# Patient Record
Sex: MALE | Race: WHITE | NOT HISPANIC OR LATINO | Employment: FULL TIME | URBAN - METROPOLITAN AREA
[De-identification: names, ages, dates, MRNs, and addresses within clinical notes are randomized per-mention and may not be internally consistent; named-entity substitution may affect disease eponyms.]

---

## 2023-10-01 PROBLEM — J44.9 COPD (CHRONIC OBSTRUCTIVE PULMONARY DISEASE) (MULTI): Status: ACTIVE | Noted: 2023-10-01

## 2023-10-01 PROBLEM — R93.1 ABNORMAL FINDINGS ON CARDIAC CATHETERIZATION: Status: ACTIVE | Noted: 2023-10-01

## 2023-10-01 PROBLEM — I50.30 HEART FAILURE WITH PRESERVED LEFT VENTRICULAR FUNCTION (MULTI): Status: ACTIVE | Noted: 2023-10-01

## 2023-10-01 PROBLEM — I50.9 CHF (CONGESTIVE HEART FAILURE) (MULTI): Status: ACTIVE | Noted: 2023-10-01

## 2023-10-01 PROBLEM — R06.09 EXERTIONAL DYSPNEA: Status: ACTIVE | Noted: 2023-10-01

## 2023-10-01 PROBLEM — I25.10 ATHEROSCLEROSIS OF CORONARY ARTERY: Status: ACTIVE | Noted: 2023-10-01

## 2023-10-01 PROBLEM — Z95.5 HISTORY OF HEART ARTERY STENT: Status: ACTIVE | Noted: 2023-10-01

## 2023-10-01 RX ORDER — ATORVASTATIN CALCIUM 80 MG/1
80 TABLET, FILM COATED ORAL EVERY MORNING
COMMUNITY

## 2023-10-01 RX ORDER — ASPIRIN 81 MG/1
81 TABLET ORAL DAILY
COMMUNITY

## 2023-10-01 RX ORDER — FLUOXETINE HYDROCHLORIDE 40 MG/1
2 CAPSULE ORAL DAILY
COMMUNITY
End: 2023-10-30 | Stop reason: ENTERED-IN-ERROR

## 2023-10-01 RX ORDER — BUPROPION HYDROCHLORIDE 100 MG/1
100 TABLET ORAL 2 TIMES DAILY
COMMUNITY

## 2023-10-01 RX ORDER — METOPROLOL SUCCINATE 50 MG/1
50 TABLET, EXTENDED RELEASE ORAL DAILY
COMMUNITY
End: 2023-10-03 | Stop reason: SINTOL

## 2023-10-01 RX ORDER — FUROSEMIDE 40 MG/1
40 TABLET ORAL DAILY
COMMUNITY
End: 2023-10-06 | Stop reason: SDUPTHER

## 2023-10-01 RX ORDER — CLOPIDOGREL BISULFATE 75 MG/1
75 TABLET ORAL EVERY MORNING
COMMUNITY

## 2023-10-01 RX ORDER — POTASSIUM CHLORIDE 750 MG/1
10 CAPSULE, EXTENDED RELEASE ORAL EVERY MORNING
COMMUNITY

## 2023-10-01 RX ORDER — NORTRIPTYLINE HYDROCHLORIDE 75 MG/1
2 CAPSULE ORAL NIGHTLY
COMMUNITY

## 2023-10-01 RX ORDER — IBUPROFEN 200 MG
TABLET ORAL
COMMUNITY
End: 2023-10-30 | Stop reason: ENTERED-IN-ERROR

## 2023-10-01 RX ORDER — LISINOPRIL AND HYDROCHLOROTHIAZIDE 20; 25 MG/1; MG/1
1 TABLET ORAL DAILY
COMMUNITY
End: 2023-10-30 | Stop reason: ENTERED-IN-ERROR

## 2023-10-03 ENCOUNTER — TELEPHONE (OUTPATIENT)
Dept: CARDIOLOGY | Facility: HOSPITAL | Age: 53
End: 2023-10-03

## 2023-10-03 ENCOUNTER — OFFICE VISIT (OUTPATIENT)
Dept: CARDIOLOGY | Facility: HOSPITAL | Age: 53
End: 2023-10-03
Payer: COMMERCIAL

## 2023-10-03 VITALS
WEIGHT: 315 LBS | HEART RATE: 91 BPM | HEIGHT: 74 IN | OXYGEN SATURATION: 96 % | BODY MASS INDEX: 40.43 KG/M2 | DIASTOLIC BLOOD PRESSURE: 77 MMHG | SYSTOLIC BLOOD PRESSURE: 139 MMHG

## 2023-10-03 DIAGNOSIS — I10 ESSENTIAL HYPERTENSION: Primary | ICD-10-CM

## 2023-10-03 DIAGNOSIS — J44.9 CHRONIC OBSTRUCTIVE PULMONARY DISEASE, UNSPECIFIED COPD TYPE (MULTI): ICD-10-CM

## 2023-10-03 DIAGNOSIS — I25.10 CORONARY ARTERY DISEASE INVOLVING NATIVE CORONARY ARTERY OF NATIVE HEART WITHOUT ANGINA PECTORIS: ICD-10-CM

## 2023-10-03 DIAGNOSIS — I50.32 CHRONIC HEART FAILURE WITH PRESERVED EJECTION FRACTION (MULTI): ICD-10-CM

## 2023-10-03 DIAGNOSIS — E78.2 MIXED HYPERLIPIDEMIA: ICD-10-CM

## 2023-10-03 PROCEDURE — 99215 OFFICE O/P EST HI 40 MIN: CPT | Performed by: STUDENT IN AN ORGANIZED HEALTH CARE EDUCATION/TRAINING PROGRAM

## 2023-10-03 PROCEDURE — 3075F SYST BP GE 130 - 139MM HG: CPT | Performed by: STUDENT IN AN ORGANIZED HEALTH CARE EDUCATION/TRAINING PROGRAM

## 2023-10-03 PROCEDURE — 3078F DIAST BP <80 MM HG: CPT | Performed by: STUDENT IN AN ORGANIZED HEALTH CARE EDUCATION/TRAINING PROGRAM

## 2023-10-03 PROCEDURE — 93005 ELECTROCARDIOGRAM TRACING: CPT | Performed by: STUDENT IN AN ORGANIZED HEALTH CARE EDUCATION/TRAINING PROGRAM

## 2023-10-03 PROCEDURE — 3008F BODY MASS INDEX DOCD: CPT | Performed by: STUDENT IN AN ORGANIZED HEALTH CARE EDUCATION/TRAINING PROGRAM

## 2023-10-03 RX ORDER — DILTIAZEM HYDROCHLORIDE 180 MG/1
180 CAPSULE, COATED, EXTENDED RELEASE ORAL DAILY
Qty: 90 CAPSULE | Refills: 1 | Status: SHIPPED | OUTPATIENT
Start: 2023-10-03 | End: 2024-04-08 | Stop reason: SDUPTHER

## 2023-10-03 RX ORDER — SERTRALINE HYDROCHLORIDE 100 MG/1
100 TABLET, FILM COATED ORAL 2 TIMES DAILY
COMMUNITY

## 2023-10-03 NOTE — PROGRESS NOTES
Subjective   Corey Jj is a 53 y.o. male. with a medical history of HfpEF, hypertension, and depression admitted with HFpEF and COPD exacerbation treated with TYRA Lasix. [facility] with MARIANA x1 to ostial RCA 4/2023 here today for follow up    He feels better since last visit  He takes 40 mg every day he gained some weight  He drinks lot of fluids  Has not been to cardiac rehab   He has troubles with erection and thinks related  to metoprolol  He quit smoking since last admission     Chief Complaint:  No chief complaint on file.    HPI    Review of Systems   All other systems reviewed and are negative.  12 points ROS negative expect for the above     Objective   Constitutional:       Appearance: Healthy appearance. Not in distress.   Neck:      Vascular: No JVR. JVD normal.   Pulmonary:      Effort: Pulmonary effort is normal.      Breath sounds: Normal breath sounds. No wheezing. No rhonchi. No rales.   Chest:      Chest wall: Not tender to palpatation.   Cardiovascular:      PMI at left midclavicular line. Normal rate. Regular rhythm. Normal S1. Normal S2.       Murmurs: There is no murmur.      No gallop.  No click. No rub.   Pulses:     Intact distal pulses.   Edema:     Peripheral edema absent.   Abdominal:      General: Bowel sounds are normal.      Palpations: Abdomen is soft.      Tenderness: There is no abdominal tenderness.   Musculoskeletal: Normal range of motion.         General: No tenderness. Skin:     General: Skin is warm and dry.   Neurological:      General: No focal deficit present.      Mental Status: Alert and oriented to person, place and time.         Lab Review:   No visits with results within 6 Month(s) from this visit.   Latest known visit with results is:   No results found for any previous visit.       Assessment/Plan   There were no encounter diagnoses.  Corey Jj is a 53 y.o. male. with a medical history of HfpEF, hypertension, and depression admitted with HFpEF and COPD exacerbation  treated with TYRA Lasix. [facility] with MARIANA x1 to ostial RCA 4/2023 here today for follow up    TTE with normal EF, asymmetrical septal hypertrophy     Overall he is doing well from cardiac perspective, however he gained weight.       plan  continue ASA  continue Plavix  continue atorvastatin  Switch toprol to diltiazem 180 mg every day given Erectile dysfunction   Continue Lasix 40 mg qd, increase lasix to 40 bid for 7 days, and take additional if needed in the future, weight based. Limit po fluid intake   Continue lisinopril/HCTZ  PCP follow up   Pulm referral for COPD   Cardiac rehab

## 2023-10-03 NOTE — PATIENT INSTRUCTIONS
Switch metoprolol to diltiazem 180 mg every day  Increase lasix to 40 mg twice a day for a week  Limit amount of fluid intake to 2L maximal per day  Will refer to cardiac rehab  Will refer to pulmonology   Ill see you back after 6 months

## 2023-10-05 LAB
ATRIAL RATE: 90 BPM
P AXIS: 60 DEGREES
P OFFSET: 199 MS
P ONSET: 125 MS
PR INTERVAL: 182 MS
Q ONSET: 216 MS
QRS COUNT: 15 BEATS
QRS DURATION: 96 MS
QT INTERVAL: 380 MS
QTC CALCULATION(BAZETT): 464 MS
QTC FREDERICIA: 435 MS
R AXIS: 32 DEGREES
T AXIS: 88 DEGREES
T OFFSET: 406 MS
VENTRICULAR RATE: 90 BPM

## 2023-10-05 PROCEDURE — 93010 ELECTROCARDIOGRAM REPORT: CPT | Performed by: STUDENT IN AN ORGANIZED HEALTH CARE EDUCATION/TRAINING PROGRAM

## 2023-10-06 DIAGNOSIS — I50.9 CONGESTIVE HEART FAILURE, UNSPECIFIED HF CHRONICITY, UNSPECIFIED HEART FAILURE TYPE (MULTI): ICD-10-CM

## 2023-10-06 RX ORDER — FUROSEMIDE 40 MG/1
40 TABLET ORAL DAILY
Qty: 30 TABLET | Refills: 6 | Status: SHIPPED | OUTPATIENT
Start: 2023-10-06 | End: 2023-10-17 | Stop reason: SDUPTHER

## 2023-10-17 DIAGNOSIS — I50.9 CONGESTIVE HEART FAILURE, UNSPECIFIED HF CHRONICITY, UNSPECIFIED HEART FAILURE TYPE (MULTI): ICD-10-CM

## 2023-10-17 RX ORDER — FUROSEMIDE 40 MG/1
40 TABLET ORAL DAILY
Qty: 30 TABLET | Refills: 6 | Status: SHIPPED | OUTPATIENT
Start: 2023-10-17 | End: 2023-11-01 | Stop reason: HOSPADM

## 2023-10-30 ENCOUNTER — APPOINTMENT (OUTPATIENT)
Dept: RADIOLOGY | Facility: HOSPITAL | Age: 53
End: 2023-10-30
Payer: COMMERCIAL

## 2023-10-30 ENCOUNTER — HOSPITAL ENCOUNTER (INPATIENT)
Facility: HOSPITAL | Age: 53
LOS: 2 days | Discharge: HOME | End: 2023-11-01
Attending: EMERGENCY MEDICINE | Admitting: STUDENT IN AN ORGANIZED HEALTH CARE EDUCATION/TRAINING PROGRAM
Payer: COMMERCIAL

## 2023-10-30 DIAGNOSIS — R09.02 HYPOXIC: ICD-10-CM

## 2023-10-30 DIAGNOSIS — I50.30 HEART FAILURE WITH PRESERVED LEFT VENTRICULAR FUNCTION (MULTI): ICD-10-CM

## 2023-10-30 DIAGNOSIS — I50.9 ACUTE ON CHRONIC CONGESTIVE HEART FAILURE, UNSPECIFIED HEART FAILURE TYPE (MULTI): Primary | ICD-10-CM

## 2023-10-30 LAB
ALBUMIN SERPL BCP-MCNC: 4 G/DL (ref 3.4–5)
ALP SERPL-CCNC: 104 U/L (ref 33–120)
ALT SERPL W P-5'-P-CCNC: 31 U/L (ref 10–52)
ANION GAP BLDV CALCULATED.4IONS-SCNC: 8 MMOL/L (ref 10–25)
ANION GAP SERPL CALC-SCNC: 15 MMOL/L (ref 10–20)
APTT PPP: 30 SECONDS (ref 27–38)
AST SERPL W P-5'-P-CCNC: 20 U/L (ref 9–39)
BASE EXCESS BLDV CALC-SCNC: 3.1 MMOL/L (ref -2–3)
BASOPHILS # BLD AUTO: 0.06 X10*3/UL (ref 0–0.1)
BASOPHILS NFR BLD AUTO: 0.6 %
BILIRUB SERPL-MCNC: 0.6 MG/DL (ref 0–1.2)
BNP SERPL-MCNC: 159 PG/ML (ref 0–99)
BODY TEMPERATURE: 37 DEGREES CELSIUS
BUN SERPL-MCNC: 19 MG/DL (ref 6–23)
CA-I BLDV-SCNC: 1.17 MMOL/L (ref 1.1–1.33)
CALCIUM SERPL-MCNC: 8.8 MG/DL (ref 8.6–10.3)
CARDIAC TROPONIN I PNL SERPL HS: 110 NG/L (ref 0–20)
CARDIAC TROPONIN I PNL SERPL HS: 197 NG/L (ref 0–20)
CHLORIDE BLDV-SCNC: 106 MMOL/L (ref 98–107)
CHLORIDE SERPL-SCNC: 104 MMOL/L (ref 98–107)
CO2 SERPL-SCNC: 27 MMOL/L (ref 21–32)
CREAT SERPL-MCNC: 1.37 MG/DL (ref 0.5–1.3)
EOSINOPHIL # BLD AUTO: 0.08 X10*3/UL (ref 0–0.7)
EOSINOPHIL NFR BLD AUTO: 0.8 %
ERYTHROCYTE [DISTWIDTH] IN BLOOD BY AUTOMATED COUNT: 14 % (ref 11.5–14.5)
FLUAV RNA RESP QL NAA+PROBE: NOT DETECTED
FLUBV RNA RESP QL NAA+PROBE: NOT DETECTED
GFR SERPL CREATININE-BSD FRML MDRD: 62 ML/MIN/1.73M*2
GLUCOSE BLD MANUAL STRIP-MCNC: 148 MG/DL (ref 74–99)
GLUCOSE BLDV-MCNC: 167 MG/DL (ref 74–99)
GLUCOSE SERPL-MCNC: 156 MG/DL (ref 74–99)
HCO3 BLDV-SCNC: 29 MMOL/L (ref 22–26)
HCT VFR BLD AUTO: 35.9 % (ref 41–52)
HCT VFR BLD EST: 35 % (ref 41–52)
HGB BLD-MCNC: 11 G/DL (ref 13.5–17.5)
HGB BLDV-MCNC: 11.5 G/DL (ref 13.5–17.5)
IMM GRANULOCYTES # BLD AUTO: 0.08 X10*3/UL (ref 0–0.7)
IMM GRANULOCYTES NFR BLD AUTO: 0.8 % (ref 0–0.9)
INHALED O2 CONCENTRATION: 0 %
INR PPP: 1.1 (ref 0.9–1.1)
LACTATE BLDV-SCNC: 1.1 MMOL/L (ref 0.4–2)
LYMPHOCYTES # BLD AUTO: 1.12 X10*3/UL (ref 1.2–4.8)
LYMPHOCYTES NFR BLD AUTO: 10.7 %
MCH RBC QN AUTO: 26.8 PG (ref 26–34)
MCHC RBC AUTO-ENTMCNC: 30.6 G/DL (ref 32–36)
MCV RBC AUTO: 88 FL (ref 80–100)
MONOCYTES # BLD AUTO: 0.55 X10*3/UL (ref 0.1–1)
MONOCYTES NFR BLD AUTO: 5.2 %
NEUTROPHILS # BLD AUTO: 8.59 X10*3/UL (ref 1.2–7.7)
NEUTROPHILS NFR BLD AUTO: 81.9 %
NRBC BLD-RTO: 0 /100 WBCS (ref 0–0)
OXYHGB MFR BLDV: 76.2 % (ref 45–75)
PCO2 BLDV: 49 MM HG (ref 41–51)
PH BLDV: 7.38 PH (ref 7.33–7.43)
PLATELET # BLD AUTO: 327 X10*3/UL (ref 150–450)
PMV BLD AUTO: 9.1 FL (ref 7.5–11.5)
PO2 BLDV: 48 MM HG (ref 35–45)
POTASSIUM BLDV-SCNC: 3.9 MMOL/L (ref 3.5–5.3)
POTASSIUM SERPL-SCNC: 3.7 MMOL/L (ref 3.5–5.3)
PROT SERPL-MCNC: 6.9 G/DL (ref 6.4–8.2)
PROTHROMBIN TIME: 12.1 SECONDS (ref 9.8–12.8)
RBC # BLD AUTO: 4.1 X10*6/UL (ref 4.5–5.9)
SAO2 % BLDV: 79 % (ref 45–75)
SARS-COV-2 RNA RESP QL NAA+PROBE: NOT DETECTED
SODIUM BLDV-SCNC: 139 MMOL/L (ref 136–145)
SODIUM SERPL-SCNC: 142 MMOL/L (ref 136–145)
WBC # BLD AUTO: 10.5 X10*3/UL (ref 4.4–11.3)

## 2023-10-30 PROCEDURE — 85025 COMPLETE CBC W/AUTO DIFF WBC: CPT | Performed by: NURSE PRACTITIONER

## 2023-10-30 PROCEDURE — 85730 THROMBOPLASTIN TIME PARTIAL: CPT | Performed by: NURSE PRACTITIONER

## 2023-10-30 PROCEDURE — 94660 CPAP INITIATION&MGMT: CPT

## 2023-10-30 PROCEDURE — 84132 ASSAY OF SERUM POTASSIUM: CPT | Performed by: NURSE PRACTITIONER

## 2023-10-30 PROCEDURE — 96372 THER/PROPH/DIAG INJ SC/IM: CPT

## 2023-10-30 PROCEDURE — 2500000001 HC RX 250 WO HCPCS SELF ADMINISTERED DRUGS (ALT 637 FOR MEDICARE OP)

## 2023-10-30 PROCEDURE — 85610 PROTHROMBIN TIME: CPT | Performed by: NURSE PRACTITIONER

## 2023-10-30 PROCEDURE — 87636 SARSCOV2 & INF A&B AMP PRB: CPT | Performed by: NURSE PRACTITIONER

## 2023-10-30 PROCEDURE — 5A09357 ASSISTANCE WITH RESPIRATORY VENTILATION, LESS THAN 24 CONSECUTIVE HOURS, CONTINUOUS POSITIVE AIRWAY PRESSURE: ICD-10-PCS | Performed by: STUDENT IN AN ORGANIZED HEALTH CARE EDUCATION/TRAINING PROGRAM

## 2023-10-30 PROCEDURE — 36415 COLL VENOUS BLD VENIPUNCTURE: CPT | Performed by: NURSE PRACTITIONER

## 2023-10-30 PROCEDURE — 2550000001 HC RX 255 CONTRASTS: Performed by: EMERGENCY MEDICINE

## 2023-10-30 PROCEDURE — 82947 ASSAY GLUCOSE BLOOD QUANT: CPT

## 2023-10-30 PROCEDURE — 71045 X-RAY EXAM CHEST 1 VIEW: CPT | Mod: FY

## 2023-10-30 PROCEDURE — 84484 ASSAY OF TROPONIN QUANT: CPT | Performed by: NURSE PRACTITIONER

## 2023-10-30 PROCEDURE — 2500000004 HC RX 250 GENERAL PHARMACY W/ HCPCS (ALT 636 FOR OP/ED): Performed by: NURSE PRACTITIONER

## 2023-10-30 PROCEDURE — 96374 THER/PROPH/DIAG INJ IV PUSH: CPT

## 2023-10-30 PROCEDURE — 2500000004 HC RX 250 GENERAL PHARMACY W/ HCPCS (ALT 636 FOR OP/ED)

## 2023-10-30 PROCEDURE — 71275 CT ANGIOGRAPHY CHEST: CPT | Performed by: SURGERY

## 2023-10-30 PROCEDURE — 99285 EMERGENCY DEPT VISIT HI MDM: CPT | Mod: 25 | Performed by: EMERGENCY MEDICINE

## 2023-10-30 PROCEDURE — 2060000001 HC INTERMEDIATE ICU ROOM DAILY

## 2023-10-30 PROCEDURE — 83880 ASSAY OF NATRIURETIC PEPTIDE: CPT | Performed by: NURSE PRACTITIONER

## 2023-10-30 PROCEDURE — 71045 X-RAY EXAM CHEST 1 VIEW: CPT | Performed by: RADIOLOGY

## 2023-10-30 PROCEDURE — 71275 CT ANGIOGRAPHY CHEST: CPT | Mod: MG

## 2023-10-30 RX ORDER — DEXTROSE MONOHYDRATE 100 MG/ML
0.3 INJECTION, SOLUTION INTRAVENOUS ONCE AS NEEDED
Status: DISCONTINUED | OUTPATIENT
Start: 2023-10-30 | End: 2023-11-01 | Stop reason: HOSPADM

## 2023-10-30 RX ORDER — ASPIRIN 300 MG/1
150 SUPPOSITORY RECTAL DAILY
Status: DISCONTINUED | OUTPATIENT
Start: 2023-10-30 | End: 2023-10-31

## 2023-10-30 RX ORDER — ASPIRIN 81 MG/1
81 TABLET ORAL DAILY
Status: DISCONTINUED | OUTPATIENT
Start: 2023-10-31 | End: 2023-11-01 | Stop reason: HOSPADM

## 2023-10-30 RX ORDER — FUROSEMIDE 10 MG/ML
40 INJECTION INTRAMUSCULAR; INTRAVENOUS ONCE
Status: DISCONTINUED | OUTPATIENT
Start: 2023-10-30 | End: 2023-10-30

## 2023-10-30 RX ORDER — INSULIN LISPRO 100 [IU]/ML
0-10 INJECTION, SOLUTION INTRAVENOUS; SUBCUTANEOUS
Status: DISCONTINUED | OUTPATIENT
Start: 2023-10-31 | End: 2023-11-01 | Stop reason: HOSPADM

## 2023-10-30 RX ORDER — FUROSEMIDE 10 MG/ML
80 INJECTION INTRAMUSCULAR; INTRAVENOUS ONCE
Status: COMPLETED | OUTPATIENT
Start: 2023-10-30 | End: 2023-10-30

## 2023-10-30 RX ORDER — ALBUTEROL SULFATE 90 UG/1
2 AEROSOL, METERED RESPIRATORY (INHALATION) EVERY 4 HOURS PRN
Status: DISCONTINUED | OUTPATIENT
Start: 2023-10-30 | End: 2023-11-01 | Stop reason: HOSPADM

## 2023-10-30 RX ORDER — ATORVASTATIN CALCIUM 80 MG/1
80 TABLET, FILM COATED ORAL EVERY MORNING
Status: DISCONTINUED | OUTPATIENT
Start: 2023-10-31 | End: 2023-11-01 | Stop reason: HOSPADM

## 2023-10-30 RX ORDER — ENOXAPARIN SODIUM 100 MG/ML
40 INJECTION SUBCUTANEOUS EVERY 24 HOURS
Status: DISCONTINUED | OUTPATIENT
Start: 2023-10-30 | End: 2023-11-01 | Stop reason: HOSPADM

## 2023-10-30 RX ORDER — NAPROXEN SODIUM 220 MG/1
162 TABLET, FILM COATED ORAL DAILY
Status: DISCONTINUED | OUTPATIENT
Start: 2023-10-30 | End: 2023-10-31

## 2023-10-30 RX ORDER — DILTIAZEM HYDROCHLORIDE 180 MG/1
180 CAPSULE, COATED, EXTENDED RELEASE ORAL EVERY MORNING
Status: DISCONTINUED | OUTPATIENT
Start: 2023-10-31 | End: 2023-11-01 | Stop reason: HOSPADM

## 2023-10-30 RX ORDER — SERTRALINE HYDROCHLORIDE 50 MG/1
100 TABLET, FILM COATED ORAL 2 TIMES DAILY
Status: DISCONTINUED | OUTPATIENT
Start: 2023-10-30 | End: 2023-11-01 | Stop reason: HOSPADM

## 2023-10-30 RX ORDER — CLOPIDOGREL BISULFATE 75 MG/1
75 TABLET ORAL EVERY MORNING
Status: DISCONTINUED | OUTPATIENT
Start: 2023-10-31 | End: 2023-11-01 | Stop reason: HOSPADM

## 2023-10-30 RX ORDER — NORTRIPTYLINE HYDROCHLORIDE 25 MG/1
150 CAPSULE ORAL NIGHTLY
Status: DISCONTINUED | OUTPATIENT
Start: 2023-10-30 | End: 2023-11-01 | Stop reason: HOSPADM

## 2023-10-30 RX ORDER — DEXTROSE 50 % IN WATER (D50W) INTRAVENOUS SYRINGE
25
Status: DISCONTINUED | OUTPATIENT
Start: 2023-10-30 | End: 2023-11-01 | Stop reason: HOSPADM

## 2023-10-30 RX ORDER — FUROSEMIDE 10 MG/ML
80 INJECTION INTRAMUSCULAR; INTRAVENOUS EVERY 12 HOURS
Status: DISCONTINUED | OUTPATIENT
Start: 2023-10-31 | End: 2023-10-31

## 2023-10-30 RX ORDER — BUPROPION HYDROCHLORIDE 100 MG/1
100 TABLET ORAL 2 TIMES DAILY
Status: DISCONTINUED | OUTPATIENT
Start: 2023-10-30 | End: 2023-11-01 | Stop reason: HOSPADM

## 2023-10-30 RX ADMIN — FUROSEMIDE 80 MG: 10 INJECTION, SOLUTION INTRAMUSCULAR; INTRAVENOUS at 18:50

## 2023-10-30 RX ADMIN — FUROSEMIDE 80 MG: 10 INJECTION, SOLUTION INTRAMUSCULAR; INTRAVENOUS at 23:50

## 2023-10-30 RX ADMIN — BUPROPION HYDROCHLORIDE 100 MG: 100 TABLET, FILM COATED ORAL at 23:49

## 2023-10-30 RX ADMIN — Medication 80 PERCENT: at 18:00

## 2023-10-30 RX ADMIN — SERTRALINE HYDROCHLORIDE 100 MG: 50 TABLET ORAL at 23:49

## 2023-10-30 RX ADMIN — IOHEXOL 150 ML: 350 INJECTION, SOLUTION INTRAVENOUS at 20:37

## 2023-10-30 RX ADMIN — ENOXAPARIN SODIUM 40 MG: 40 INJECTION SUBCUTANEOUS at 23:51

## 2023-10-30 RX ADMIN — Medication 6 L/MIN: at 20:00

## 2023-10-30 RX ADMIN — NORTRIPTYLINE HYDROCHLORIDE 150 MG: 25 CAPSULE ORAL at 23:50

## 2023-10-30 ASSESSMENT — COLUMBIA-SUICIDE SEVERITY RATING SCALE - C-SSRS
2. HAVE YOU ACTUALLY HAD ANY THOUGHTS OF KILLING YOURSELF?: NO
6. HAVE YOU EVER DONE ANYTHING, STARTED TO DO ANYTHING, OR PREPARED TO DO ANYTHING TO END YOUR LIFE?: NO
1. IN THE PAST MONTH, HAVE YOU WISHED YOU WERE DEAD OR WISHED YOU COULD GO TO SLEEP AND NOT WAKE UP?: NO

## 2023-10-30 ASSESSMENT — PAIN SCALES - GENERAL
PAINLEVEL_OUTOF10: 4
PAINLEVEL_OUTOF10: 5 - MODERATE PAIN

## 2023-10-30 ASSESSMENT — PAIN - FUNCTIONAL ASSESSMENT: PAIN_FUNCTIONAL_ASSESSMENT: 0-10

## 2023-10-30 ASSESSMENT — LIFESTYLE VARIABLES
EVER FELT BAD OR GUILTY ABOUT YOUR DRINKING: NO
HAVE YOU EVER FELT YOU SHOULD CUT DOWN ON YOUR DRINKING: NO
EVER HAD A DRINK FIRST THING IN THE MORNING TO STEADY YOUR NERVES TO GET RID OF A HANGOVER: NO
REASON UNABLE TO ASSESS: YES
HAVE PEOPLE ANNOYED YOU BY CRITICIZING YOUR DRINKING: NO

## 2023-10-30 ASSESSMENT — PAIN DESCRIPTION - LOCATION: LOCATION: CHEST

## 2023-10-30 NOTE — ED PROVIDER NOTES
@Emergency Department Encounter  [facility] EMERGENCY MEDICINE    Patient: Corey Jj  MRN: [id number]  : 1970  Date of Evaluation: 10/30/2023  ED Provider: CHAD Davison    ED care was supervised by Dr. Vaughn who independently examined and evaluated the patient. Please see their attestation note for further details.    Limitations to history: none  Independent Historian: self, EMS  Records reviewed: Care everywhere, paper chart, Inpatient and outpatient notes    Chief Complaint       Chief Complaint   Patient presents with    Shortness of Breath    Chest Pain     [place]    (Location/Symptom, Timing/Onset, Context/Setting, Quality, Duration, Modifying Factors, Severity) Note limiting factors.     Corey Jj is a 53 y.o. male who presents to the emergency department complaining of chest pain, shortness of breath that started earlier today.  States that he felt like his shortness of breath was due to fluid overload.  States that has been having some difficulties over the last week and his doctor increased his Lasix to double the dose last week.  Denies any fevers, chills, cough, abdominal pain, nausea, vomiting.  Does have a history of COPD but states that this does not feel like a COPD exacerbation.  Per EMS patient was tachypneic, in respiratory distress and was noted to be 75% on room air.  Was placed on CPAP prior to arrival and presents to the ER with CPAP in place.  Patient reports improvement of symptoms with CPAP administration, was also given nitroglycerin prior to arrival.    ROS:     Review of Systems  14 systems reviewed and otherwise acutely negative except as in the [place].          Past History     Past Medical History:   Diagnosis Date    COPD (chronic obstructive pulmonary disease) (CMS/HCC)     Diabetes (CMS/HCC)     MI (myocardial infarction) (CMS/[facility])      History reviewed. No pertinent surgical history.  Social History     Socioeconomic History    Marital status:       Spouse name: None    Number of children: None    Years of education: None    Highest education level: None   Occupational History    None   Tobacco Use    Smoking status: Never    Smokeless tobacco: Current     Types: Chew   Vaping Use    Vaping Use: Never used   Substance and Sexual Activity    Alcohol use: Never    Drug use: Never    Sexual activity: None   Other Topics Concern    None   Social History Narrative    None     Social Determinants of Health     Financial Resource Strain: Not on file   Food Insecurity: Not on file   Transportation Needs: Not on file   Physical Activity: Not on file   Stress: Not on file   Social Connections: Not on file   Intimate Partner Violence: Not on file   Housing Stability: Not on file       Medications/Allergies     Previous Medications    ALBUTEROL 108 (90 BASE) MCG/ACT INHALER    Inhale 2 puffs every 4 hours if needed.    ASPIRIN 81 MG EC TABLET    Take 1 tablet (81 mg) by mouth once daily. sometimes    ATORVASTATIN (LIPITOR) 80 MG TABLET    Take 1 tablet (80 mg) by mouth once daily in the morning.    BUPROPION (WELLBUTRIN) 100 MG TABLET    Take 1 tablet (100 mg) by mouth 2 times a day.    CLOPIDOGREL (PLAVIX) 75 MG TABLET    Take 1 tablet (75 mg) by mouth once daily in the morning.    DILTIAZEM CD (CARDIZEM CD) 180 MG 24 HR CAPSULE    Take 1 capsule (180 mg) by mouth once daily.    FUROSEMIDE (LASIX) 40 MG TABLET    Take 1 tablet (40 mg) by mouth once daily.    NORTRIPTYLINE (PAMELOR) 75 MG CAPSULE    Take 2 capsules (150 mg) by mouth once daily at bedtime.    POTASSIUM CHLORIDE ER (MICRO-K) 10 MEQ ER CAPSULE    Take 1 capsule (10 mEq) by mouth once daily in the morning. Do not crush or chew. Take when taking lasix    SERTRALINE (ZOLOFT) 100 MG TABLET    Take 1 tablet (100 mg) by mouth 2 times a day.     No Known Allergies     Physical Exam       ED Triage Vitals   Temp Pulse Resp BP   -- -- -- --      SpO2 Temp src Heart Rate Source Patient Position   -- -- --  --      BP Location FiO2 (%)     -- --         Physical Exam    GENERAL:  The patient appears nourished and normally developed. Vital signs as documented.     HEENT:  Head normocephalic, atraumatic, EOMs intact, PERRLA, Mucous membranes moist. Nares patent without copious rhinorrhea.  No lymphadenopathy.    PULMONARY:  Lungs are clear to auscultation, without any respiratory distress. Able to speak full sentences, no accessory muscle use    CARDIAC:   Normal rate. No murmurs, rubs or gallops    ABDOMEN:  Soft, non-distended, non-tender, BS positive x 4 quadrants, No rebound or guarding, no peritoneal signs, no CVA tenderness, no masses or organomegaly    MUSCULOSKELETAL:   Able to ambulate, + bilateral lower extremity edema, with no obvious deformities. Pulses intact distal    SKIN:   Good color, with no significant rashes.  No pallor.    NEURO:  No obvious neurological deficits, normal sensation and strength bilaterally.  Able to follow commands, NIH 0, CN 2-12 intact.        Diagnostics   Labs:  Results for orders placed or performed during the hospital encounter of 10/30/23   CBC and Auto Differential   Result Value Ref Range    WBC 10.5 4.4 - 11.3 x10*3/uL    nRBC 0.0 0.0 - 0.0 /100 WBCs    RBC 4.10 (L) 4.50 - 5.90 x10*6/uL    Hemoglobin 11.0 (L) 13.5 - 17.5 g/dL    Hematocrit 35.9 (L) 41.0 - 52.0 %    MCV 88 80 - 100 fL    MCH 26.8 26.0 - 34.0 pg    MCHC 30.6 (L) 32.0 - 36.0 g/dL    RDW 14.0 11.5 - 14.5 %    Platelets 327 150 - 450 x10*3/uL    MPV 9.1 7.5 - 11.5 fL    Neutrophils % 81.9 40.0 - 80.0 %    Immature Granulocytes %, Automated 0.8 0.0 - 0.9 %    Lymphocytes % 10.7 13.0 - 44.0 %    Monocytes % 5.2 2.0 - 10.0 %    Eosinophils % 0.8 0.0 - 6.0 %    Basophils % 0.6 0.0 - 2.0 %    Neutrophils Absolute 8.59 (H) 1.20 - 7.70 x10*3/uL    Immature Granulocytes Absolute, Automated 0.08 0.00 - 0.70 x10*3/uL    Lymphocytes Absolute 1.12 (L) 1.20 - 4.80 x10*3/uL    Monocytes Absolute 0.55 0.10 - 1.00 x10*3/uL     Eosinophils Absolute 0.08 0.00 - 0.70 x10*3/uL    Basophils Absolute 0.06 0.00 - 0.10 x10*3/uL   Comprehensive metabolic panel   Result Value Ref Range    Glucose 156 (H) 74 - 99 mg/dL    Sodium 142 136 - 145 mmol/L    Potassium 3.7 3.5 - 5.3 mmol/L    Chloride 104 98 - 107 mmol/L    Bicarbonate 27 21 - 32 mmol/L    Anion Gap 15 10 - 20 mmol/L    Urea Nitrogen 19 6 - 23 mg/dL    Creatinine 1.37 (H) 0.50 - 1.30 mg/dL    eGFR 62 >60 mL/min/1.73m*2    Calcium 8.8 8.6 - 10.3 mg/dL    Albumin 4.0 3.4 - 5.0 g/dL    Alkaline Phosphatase 104 33 - 120 U/L    Total Protein 6.9 6.4 - 8.2 g/dL    AST 20 9 - 39 U/L    Bilirubin, Total 0.6 0.0 - 1.2 mg/dL    ALT 31 10 - 52 U/L   Troponin I, High Sensitivity   Result Value Ref Range    Troponin I, High Sensitivity 110 (HH) 0 - 20 ng/L   B-Type Natriuretic Peptide   Result Value Ref Range     (H) 0 - 99 pg/mL   Protime-INR   Result Value Ref Range    Protime 12.1 9.8 - 12.8 seconds    INR 1.1 0.9 - 1.1   APTT   Result Value Ref Range    aPTT 30 27 - 38 seconds   Influenza A, and B PCR   Result Value Ref Range    Flu A Result Not Detected Not Detected    Flu B Result Not Detected Not Detected   Blood Gas Venous Full Panel   Result Value Ref Range    POCT pH, Venous 7.38 7.33 - 7.43 pH    POCT pCO2, Venous 49 41 - 51 mm Hg    POCT pO2, Venous 48 (H) 35 - 45 mm Hg    POCT SO2, Venous 79 (H) 45 - 75 %    POCT Oxy Hemoglobin, Venous 76.2 (H) 45.0 - 75.0 %    POCT Hematocrit Calculated, Venous 35.0 (L) 41.0 - 52.0 %    POCT Sodium, Venous 139 136 - 145 mmol/L    POCT Potassium, Venous 3.9 3.5 - 5.3 mmol/L    POCT Chloride, Venous 106 98 - 107 mmol/L    POCT Ionized Calicum, Venous 1.17 1.10 - 1.33 mmol/L    POCT Glucose, Venous 167 (H) 74 - 99 mg/dL    POCT Lactate, Venous 1.1 0.4 - 2.0 mmol/L    POCT Base Excess, Venous 3.1 (H) -2.0 - 3.0 mmol/L    POCT HCO3 Calculated, Venous 29.0 (H) 22.0 - 26.0 mmol/L    POCT Hemoglobin, Venous 11.5 (L) 13.5 - 17.5 g/dL    POCT Anion Gap,  "Venous 8.0 (L) 10.0 - 25.0 mmol/L    Patient Temperature 37.0 degrees Celsius    FiO2 0 %   Sars-CoV-2 PCR, Symptomatic   Result Value Ref Range    Coronavirus 2019, PCR Not Detected Not Detected     Radiographs:  XR chest 1 view   Final Result   1.  Suspect left-greater-than-right effusions with adjacent   atelectasis.   2. Prominence of the bronchovascular interstitium may be due to   atelectasis, interstitial infiltrates or mild edema.                  MACRO:   None        Signed by: Anant Spann 10/30/2023 6:22 PM   Dictation workstation:   [id number]      CT angio chest for pulmonary embolism    (Results Pending)           Assessment   In brief, Corey Jj is a 53 y.o. male who presented to the emergency department with chest pain and shortness of breath, hypoxic on initial evaluation at 75% per EMS and on CPAP.    Plan   Switch over to BiPAP, EKG, IV, lab work, diuretic, chest x-ray    Differentials   Pulmonary edema due to CHF exacerbation  COPD exacerbation  Pneumonia  ACS  PE    ED Course     ED Course as of 10/30/23 2007   Mon Oct 30, 2023   1816 EKG obtained at 1803 showing rate of 91, no ST elevation, rhythm is sinus rhythm with premature atrial complexes, indication is chest pain and shortness of breath [ML]      ED Course User Index  [ML] JOSEPH Davison-CNP         Diagnoses as of 10/30/23 2007   Acute on chronic congestive heart failure, unspecified heart failure type (CMS/HCC)   Hypoxic       Visit Vitals  /70   Pulse 81   Temp 36.7 °C (98.1 °F) (Temporal)   Resp 22   Ht 1.88 m (6' 2\")   Wt 141 kg (310 lb)   SpO2 99%   BMI 39.80 kg/m²   Smoking Status Never   BSA 2.71 m²       Medications   oxygen (O2) therapy (80 percent inhalation Start 10/30/23 1800)   furosemide (Lasix) injection 80 mg (80 mg intravenous Given 10/30/23 1850)       Plan of care discussed, patient appears stable on initial evaluation, reports marked improvement of symptoms after CPAP administration, was also " given nitro prior to arrival.  Is able to speak full sentences with CPAP in place.  Chest x-ray showing vascular congestion and pulmonary edema, given Lasix 80 mg IV.  Troponin was noted to be 110, plan is for patient to be admitted, elevated troponin level likely due to demand however will repeat troponin and send for CT PE study.  Case discussed with and seen by ED attending      Final Impression      1. Acute on chronic congestive heart failure, unspecified heart failure type (CMS/HCC)    2. Hypoxic          DISPOSITION  Disposition: Admit to stepdown  Patient condition is stable    Comment: Please note this report has been produced using speech recognition software and may contain errors related to that system including errors in grammar, punctuation, and spelling, as well as words and phrases that may be inappropriate.  If there are any questions or concerns please feel free to contact the dictating provider for clarification.    CHAD Davison APRN-CNP  10/30/23 2007

## 2023-10-30 NOTE — PROGRESS NOTES
Pharmacy Medication History Review    Corey Jj is a 53 y.o. male admitted for No Principal Problem: There is no principal problem currently on the Problem List. Please update the Problem List and refresh.. Pharmacy reviewed the patient's [id number] medications and allergies for accuracy.    The list below reflectives the updated PTA list. Please review each medication in order reconciliation for additional clarification and justification.  (Not in a hospital admission)       The list below reflectives the updated allergy list. Please review each documented allergy for additional clarification and justification.  Allergies  Reviewed by Erick Barry RN on 10/30/2023   No Known Allergies         Below are additional concerns with the patient's PTA list.      Lanette Rudd CPhT

## 2023-10-30 NOTE — ED TRIAGE NOTES
Pt BIBA with c/o chest pain and SOB, onset this PM while pt was at work. Pt with hx of MI. Pt wears CPAP at night. Pt hypoxic in the 70's on room air per EMS. Pt placed on CPAP en route, improvement to respiratory effort. Pt currently in no obvious distress.

## 2023-10-31 LAB
ALBUMIN SERPL BCP-MCNC: 3.8 G/DL (ref 3.4–5)
ANION GAP SERPL CALC-SCNC: 14 MMOL/L (ref 10–20)
BILIRUB DIRECT SERPL-MCNC: 0.1 MG/DL (ref 0–0.3)
BILIRUB SERPL-MCNC: 0.6 MG/DL (ref 0–1.2)
BUN SERPL-MCNC: 19 MG/DL (ref 6–23)
CALCIUM SERPL-MCNC: 8.6 MG/DL (ref 8.6–10.3)
CARDIAC TROPONIN I PNL SERPL HS: 197 NG/L (ref 0–20)
CHLORIDE SERPL-SCNC: 103 MMOL/L (ref 98–107)
CO2 SERPL-SCNC: 28 MMOL/L (ref 21–32)
CREAT SERPL-MCNC: 1.36 MG/DL (ref 0.5–1.3)
ERYTHROCYTE [DISTWIDTH] IN BLOOD BY AUTOMATED COUNT: 14.6 % (ref 11.5–14.5)
GFR SERPL CREATININE-BSD FRML MDRD: 62 ML/MIN/1.73M*2
GLUCOSE BLD MANUAL STRIP-MCNC: 115 MG/DL (ref 74–99)
GLUCOSE BLD MANUAL STRIP-MCNC: 151 MG/DL (ref 74–99)
GLUCOSE BLD MANUAL STRIP-MCNC: 173 MG/DL (ref 74–99)
GLUCOSE SERPL-MCNC: 114 MG/DL (ref 74–99)
HCT VFR BLD AUTO: 34.1 % (ref 41–52)
HGB BLD-MCNC: 10.6 G/DL (ref 13.5–17.5)
HGB RETIC QN: 30 PG (ref 28–38)
IMMATURE RETIC FRACTION: 30.3 %
LDH SERPL L TO P-CCNC: 218 U/L (ref 84–246)
MAGNESIUM SERPL-MCNC: 1.97 MG/DL (ref 1.6–2.4)
MCH RBC QN AUTO: 27.3 PG (ref 26–34)
MCHC RBC AUTO-ENTMCNC: 31.1 G/DL (ref 32–36)
MCV RBC AUTO: 88 FL (ref 80–100)
NRBC BLD-RTO: 0 /100 WBCS (ref 0–0)
PHOSPHATE SERPL-MCNC: 4.3 MG/DL (ref 2.5–4.9)
PLATELET # BLD AUTO: 295 X10*3/UL (ref 150–450)
PMV BLD AUTO: 9.2 FL (ref 7.5–11.5)
POTASSIUM SERPL-SCNC: 3.6 MMOL/L (ref 3.5–5.3)
RBC # BLD AUTO: 3.88 X10*6/UL (ref 4.5–5.9)
RETICS #: 0.11 X10*6/UL (ref 0.02–0.12)
RETICS/RBC NFR AUTO: 2.8 % (ref 0.5–2)
SODIUM SERPL-SCNC: 141 MMOL/L (ref 136–145)
WBC # BLD AUTO: 6.2 X10*3/UL (ref 4.4–11.3)

## 2023-10-31 PROCEDURE — 2500000004 HC RX 250 GENERAL PHARMACY W/ HCPCS (ALT 636 FOR OP/ED)

## 2023-10-31 PROCEDURE — 82248 BILIRUBIN DIRECT: CPT

## 2023-10-31 PROCEDURE — 82247 BILIRUBIN TOTAL: CPT

## 2023-10-31 PROCEDURE — 94660 CPAP INITIATION&MGMT: CPT

## 2023-10-31 PROCEDURE — 99233 SBSQ HOSP IP/OBS HIGH 50: CPT

## 2023-10-31 PROCEDURE — 83615 LACTATE (LD) (LDH) ENZYME: CPT

## 2023-10-31 PROCEDURE — 83735 ASSAY OF MAGNESIUM: CPT

## 2023-10-31 PROCEDURE — 82947 ASSAY GLUCOSE BLOOD QUANT: CPT

## 2023-10-31 PROCEDURE — 2500000001 HC RX 250 WO HCPCS SELF ADMINISTERED DRUGS (ALT 637 FOR MEDICARE OP)

## 2023-10-31 PROCEDURE — 85027 COMPLETE CBC AUTOMATED: CPT

## 2023-10-31 PROCEDURE — 83010 ASSAY OF HAPTOGLOBIN QUANT: CPT

## 2023-10-31 PROCEDURE — 80069 RENAL FUNCTION PANEL: CPT

## 2023-10-31 PROCEDURE — 36415 COLL VENOUS BLD VENIPUNCTURE: CPT

## 2023-10-31 PROCEDURE — 2500000004 HC RX 250 GENERAL PHARMACY W/ HCPCS (ALT 636 FOR OP/ED): Performed by: STUDENT IN AN ORGANIZED HEALTH CARE EDUCATION/TRAINING PROGRAM

## 2023-10-31 PROCEDURE — 84484 ASSAY OF TROPONIN QUANT: CPT

## 2023-10-31 PROCEDURE — 85045 AUTOMATED RETICULOCYTE COUNT: CPT

## 2023-10-31 PROCEDURE — 2500000002 HC RX 250 W HCPCS SELF ADMINISTERED DRUGS (ALT 637 FOR MEDICARE OP, ALT 636 FOR OP/ED)

## 2023-10-31 PROCEDURE — 2060000001 HC INTERMEDIATE ICU ROOM DAILY

## 2023-10-31 RX ORDER — FUROSEMIDE 10 MG/ML
80 INJECTION INTRAMUSCULAR; INTRAVENOUS
Status: DISCONTINUED | OUTPATIENT
Start: 2023-10-31 | End: 2023-11-01

## 2023-10-31 RX ORDER — AMOXICILLIN 250 MG
2 CAPSULE ORAL 2 TIMES DAILY
Status: DISCONTINUED | OUTPATIENT
Start: 2023-10-31 | End: 2023-11-01 | Stop reason: HOSPADM

## 2023-10-31 RX ADMIN — ASPIRIN 81 MG: 81 TABLET, COATED ORAL at 09:42

## 2023-10-31 RX ADMIN — BUPROPION HYDROCHLORIDE 100 MG: 100 TABLET, FILM COATED ORAL at 20:34

## 2023-10-31 RX ADMIN — INSULIN LISPRO 2 UNITS: 100 INJECTION, SOLUTION INTRAVENOUS; SUBCUTANEOUS at 11:57

## 2023-10-31 RX ADMIN — SERTRALINE HYDROCHLORIDE 100 MG: 50 TABLET ORAL at 20:34

## 2023-10-31 RX ADMIN — SERTRALINE HYDROCHLORIDE 100 MG: 50 TABLET ORAL at 09:42

## 2023-10-31 RX ADMIN — INSULIN LISPRO 2 UNITS: 100 INJECTION, SOLUTION INTRAVENOUS; SUBCUTANEOUS at 17:22

## 2023-10-31 RX ADMIN — CLOPIDOGREL BISULFATE 75 MG: 75 TABLET ORAL at 09:42

## 2023-10-31 RX ADMIN — ATORVASTATIN CALCIUM 80 MG: 80 TABLET, FILM COATED ORAL at 09:42

## 2023-10-31 RX ADMIN — BUPROPION HYDROCHLORIDE 100 MG: 100 TABLET, FILM COATED ORAL at 09:42

## 2023-10-31 RX ADMIN — SENNOSIDES AND DOCUSATE SODIUM 2 TABLET: 8.6; 5 TABLET ORAL at 20:34

## 2023-10-31 RX ADMIN — FUROSEMIDE 80 MG: 10 INJECTION, SOLUTION INTRAMUSCULAR; INTRAVENOUS at 09:35

## 2023-10-31 RX ADMIN — NORTRIPTYLINE HYDROCHLORIDE 150 MG: 25 CAPSULE ORAL at 20:34

## 2023-10-31 RX ADMIN — Medication 2 L/MIN: at 09:12

## 2023-10-31 RX ADMIN — FUROSEMIDE 80 MG: 10 INJECTION, SOLUTION INTRAMUSCULAR; INTRAVENOUS at 16:23

## 2023-10-31 RX ADMIN — DILTIAZEM HYDROCHLORIDE 180 MG: 180 CAPSULE, COATED, EXTENDED RELEASE ORAL at 09:42

## 2023-10-31 SDOH — SOCIAL STABILITY: SOCIAL INSECURITY: ARE THERE ANY APPARENT SIGNS OF INJURIES/BEHAVIORS THAT COULD BE RELATED TO ABUSE/NEGLECT?: NO

## 2023-10-31 SDOH — SOCIAL STABILITY: SOCIAL INSECURITY: HAVE YOU HAD THOUGHTS OF HARMING ANYONE ELSE?: NO

## 2023-10-31 SDOH — SOCIAL STABILITY: SOCIAL INSECURITY: DO YOU FEEL UNSAFE GOING BACK TO THE PLACE WHERE YOU ARE LIVING?: NO

## 2023-10-31 SDOH — SOCIAL STABILITY: SOCIAL INSECURITY: HAS ANYONE EVER THREATENED TO HURT YOUR FAMILY OR YOUR PETS?: NO

## 2023-10-31 SDOH — SOCIAL STABILITY: SOCIAL INSECURITY: DOES ANYONE TRY TO KEEP YOU FROM HAVING/CONTACTING OTHER FRIENDS OR DOING THINGS OUTSIDE YOUR HOME?: NO

## 2023-10-31 SDOH — SOCIAL STABILITY: SOCIAL INSECURITY: ARE YOU OR HAVE YOU BEEN THREATENED OR ABUSED PHYSICALLY, EMOTIONALLY, OR SEXUALLY BY ANYONE?: NO

## 2023-10-31 SDOH — SOCIAL STABILITY: SOCIAL INSECURITY: WERE YOU ABLE TO COMPLETE ALL THE BEHAVIORAL HEALTH SCREENINGS?: YES

## 2023-10-31 SDOH — SOCIAL STABILITY: SOCIAL INSECURITY: DO YOU FEEL ANYONE HAS EXPLOITED OR TAKEN ADVANTAGE OF YOU FINANCIALLY OR OF YOUR PERSONAL PROPERTY?: NO

## 2023-10-31 SDOH — SOCIAL STABILITY: SOCIAL INSECURITY: ABUSE: ADULT

## 2023-10-31 ASSESSMENT — PAIN SCALES - GENERAL
PAINLEVEL_OUTOF10: 0 - NO PAIN

## 2023-10-31 ASSESSMENT — COGNITIVE AND FUNCTIONAL STATUS - GENERAL
DAILY ACTIVITIY SCORE: 24
PATIENT BASELINE BEDBOUND: NO
MOBILITY SCORE: 24
MOBILITY SCORE: 24
DAILY ACTIVITIY SCORE: 24

## 2023-10-31 ASSESSMENT — ACTIVITIES OF DAILY LIVING (ADL)
FEEDING YOURSELF: INDEPENDENT
TOILETING: INDEPENDENT
WALKS IN HOME: INDEPENDENT
ASSISTIVE_DEVICE: EYEGLASSES
HEARING - RIGHT EAR: DIFFICULTY WITH NOISE
BATHING: INDEPENDENT
GROOMING: INDEPENDENT
JUDGMENT_ADEQUATE_SAFELY_COMPLETE_DAILY_ACTIVITIES: YES
DRESSING YOURSELF: INDEPENDENT
PATIENT'S MEMORY ADEQUATE TO SAFELY COMPLETE DAILY ACTIVITIES?: YES
HEARING - LEFT EAR: DIFFICULTY WITH NOISE
ADEQUATE_TO_COMPLETE_ADL: YES
LACK_OF_TRANSPORTATION: NO

## 2023-10-31 ASSESSMENT — PATIENT HEALTH QUESTIONNAIRE - PHQ9
SUM OF ALL RESPONSES TO PHQ9 QUESTIONS 1 & 2: 0
1. LITTLE INTEREST OR PLEASURE IN DOING THINGS: NOT AT ALL
2. FEELING DOWN, DEPRESSED OR HOPELESS: NOT AT ALL
2. FEELING DOWN, DEPRESSED OR HOPELESS: NOT AT ALL
1. LITTLE INTEREST OR PLEASURE IN DOING THINGS: NOT AT ALL
SUM OF ALL RESPONSES TO PHQ9 QUESTIONS 1 & 2: 0

## 2023-10-31 ASSESSMENT — LIFESTYLE VARIABLES
AUDIT-C TOTAL SCORE: 0
SUBSTANCE_ABUSE_PAST_12_MONTHS: NO
PRESCIPTION_ABUSE_PAST_12_MONTHS: NO
HOW OFTEN DO YOU HAVE 6 OR MORE DRINKS ON ONE OCCASION: NEVER
HOW OFTEN DO YOU HAVE A DRINK CONTAINING ALCOHOL: NEVER
AUDIT-C TOTAL SCORE: 0
HOW MANY STANDARD DRINKS CONTAINING ALCOHOL DO YOU HAVE ON A TYPICAL DAY: PATIENT DOES NOT DRINK
SKIP TO QUESTIONS 9-10: 1

## 2023-10-31 ASSESSMENT — PAIN - FUNCTIONAL ASSESSMENT
PAIN_FUNCTIONAL_ASSESSMENT: 0-10

## 2023-10-31 NOTE — CARE PLAN
The patient's goals for the shift include to sleep    The clinical goals for the shift include to maintain sats of 92% or greater    Over the shift, the patient made progress in maintaining sats.

## 2023-10-31 NOTE — PROGRESS NOTES
"Corey Jj is a 53 y.o. male on day 1 of admission presenting with Acute on chronic congestive heart failure, unspecified heart failure type (CMS/HCC).    Subjective   The patient was seen at bedside.  He is breathing well with 3L NC and feels like his shortness of breath is improving.  He has no other symptoms at this time.       Objective     Physical Exam  Constitutional:       Appearance: He is obese.   HENT:      Head: Normocephalic and atraumatic.      Mouth/Throat:      Mouth: Mucous membranes are moist.   Eyes:      Extraocular Movements: Extraocular movements intact.      Pupils: Pupils are equal, round, and reactive to light.   Cardiovascular:      Rate and Rhythm: Normal rate and regular rhythm. JVD present     Pulses: Normal pulses.      Heart sounds: Normal heart sounds.   Pulmonary:      Effort: Pulmonary effort is normal.      Breath sounds: Normal breath sounds.   Abdominal:      General: Bowel sounds are normal.      Palpations: Abdomen is soft.      Tenderness: There is no abdominal tenderness.   Musculoskeletal:      Bilatearl lower leg: Edema present up past the knee  Skin:     General: Skin is warm.   Neurological:      General: No focal deficit present.      Mental Status: He is alert and oriented to person, place, and time.   Psychiatric:         Mood and Affect: Mood normal.     Last Recorded Vitals  Blood pressure 134/78, pulse 86, temperature 36.4 °C (97.5 °F), temperature source Temporal, resp. rate 20, height 1.88 m (6' 2\"), weight (!) 153 kg (338 lb 3 oz), SpO2 96 %.  Intake/Output last 3 Shifts:  I/O last 3 completed shifts:  In: - (0 mL/kg)   Out: 1800 (11.7 mL/kg) [Urine:1800 (0.3 mL/kg/hr)]  Weight: 153.4 kg     Relevant Results           This patient currently has cardiac telemetry ordered; if you would like to modify or discontinue the telemetry order, click here to go to the orders activity to modify/discontinue the order.                 Assessment/Plan   Principal Problem:    " Acute on chronic congestive heart failure, unspecified heart failure type (CMS/HCC)    Corey Jj is a 53 y.o. male with past medical history of HFpEF (EF 55 to 60%), CAD status post PCI to RCA with ANSHUL, hypertension, diabetes, depression/anxiety, MARV on CPAP, DM, that presents to [facility] for AHRF 2/2 acute pulmonary edema from ADHF.       #AHRF 2/2 acute pulmonary edema, R>L pleural effusions with atelectasis    #ADHF, AHA/ACC stage c, NYHA class 2-3    #elevated troponins, likely demand ischemia 2/2 above   -weaned from bipap, now on 2-3L NC. Wean as tolerated to b/l of RA   -BNP elevated, trops 110>197. EKGs with significant ischemic changes   -CTPE: limited study w/out PE. Pulmonary edema, R>L pleural effusions, cardiomegaly   -previous TTE 3/23: EF 55-60%, impaired relaxation, eleavted LAP, asymetric LVH  -will continue diuresis IV 80mg BID   -IS, acapella, duonebs   -1500cc fluid restriction   -daily wts, strict I/os, monitor electrolytes, on tele      Chronic medical conditions:      #CAD sp PCI w Anshul: continue home DAPT and statin   #MARV: home CPAP  #HTN: home meds  #DM: SSI  #MDD/BEENA: home meds      F: fluid restrict   E: PRN   N: cardiac   GI: none   DVT: lovenox and SCDs  CODE: FULL     Dispo: admitted for AHRF 2/2 acute pulmonary edema from ADHF.  eLOS>48hrs           Rocco Ling DO

## 2023-10-31 NOTE — NURSING NOTE
RN relayed information from physician, wanting patient to try a trail off bipap. Patient is now 94% SpO2 on 6L nasal cannula. RN notified.

## 2023-10-31 NOTE — CARE PLAN
The patient's goals for the shift include to et 6 hours of sleep    The clinical goals for the shift include to maintain sats of 92% or greater    Pt admitted to unit. Oriented to room new orders reviewed with patient.   Problem: Fall/Injury  Goal: Be free from injury by end of the shift  Outcome: Progressing     Problem: Pain - Adult  Goal: Verbalizes/displays adequate comfort level or baseline comfort level  Outcome: Progressing     Problem: Discharge Planning  Goal: Discharge to home or other facility with appropriate resources  Outcome: Progressing     Problem: Heart Failure  Goal: Report improvement of dyspnea/breathlessness this shift  Outcome: Progressing

## 2023-10-31 NOTE — HOSPITAL COURSE
Corey Jj is a 53 y.o. male with past medical history of HFpEF (EF 55 to 60%), CAD status post PCI to RCA with MARIANA, hypertension, diabetes, depression/anxiety, MARV on CPAP, DM, that presents to [facility] ED for acute shortness of breath.  Patient developed acute dyspnea today and was found to be satting at 75% by EMS and was started on CPAP upon arrival.  Patient has a history of CHF and has had increased dyspnea as well as abdominal distention change in bowel movements and lower extremity edema for the past couple weeks.  Was told by his PCP to increase his home dose of Lasix if developing symptoms but has since developed worsening dyspnea which prompted ED visit.  He denies any associated chest pain, palpitations, cough, lightheadedness, syncope, changes in urination, fever or chills.  He denies any recent travel or sick contacts.  He states that he is compliant with his medications, but noncompliant with fluid restricted and low-salt diet.  patient is unsure of his baseline dry weight.  He does not wear oxygen at home and is compliant with CPAP at night.     In ED the patient Asprin was given and CT angio was given to evaluate for PE.  He was also given an 80 mg IV Lasix.    When on the floor the patient continued to be diuresed with IV lasix and is on 3L NC. On 11/1 the patient improved to 2LNC and he will be evaluated today for home oxygen.  Started on torsemide and plan for discharge

## 2023-10-31 NOTE — H&P
History Of Present Illness  Corey Jj is a 53 y.o. male with past medical history of HFpEF (EF 55 to 60%), CAD status post PCI to RCA with MARIANA, hypertension, diabetes, depression/anxiety, MARV on CPAP, DM, that presents to [facility] ED for acute shortness of breath.  Patient developed acute dyspnea today and was found to be satting at 75% by EMS and was started on CPAP upon arrival.  Patient has a history of CHF and has had increased dyspnea as well as abdominal distention change in bowel movements and lower extremity edema for the past couple weeks.  Was told by his PCP to increase his home dose of Lasix if developing symptoms but has since developed worsening dyspnea which prompted ED visit.  He denies any associated chest pain, palpitations, cough, lightheadedness, syncope, changes in urination, fever or chills.  He denies any recent travel or sick contacts.  He states that he is compliant with his medications, but noncompliant with fluid restricted and low-salt diet.  patient is unsure of his baseline dry weight.  He does not wear oxygen at home and is compliant with CPAP at night.     ED course:  vitals: T 98.1, HR 98, RR 22, /94, spo2 100% bipap > wean to 3L NC   bmp: 142/3.7/204/27/19/1.37/156  cbc: 10.5/11/327  trops 110    covid and flu negative   vb.38/49  EKG: NSR prolonged QT, no ST seg changes or T wave changes   CXR: b/l pleural effusions, Prominence of the bronchovascular interstitium may be due to  atelectasis, interstitial infiltrates or mild edema.  intervention: lasix 80    Review of previous medical records  3/23: EF 55-60%. impaired relaxation, eleavted LAP, asymetric LVH ,   right and left HC: 3/23: PCI to RCA w MARIANA, PCWP elevated      Past Medical History  Past Medical History:   Diagnosis Date    COPD (chronic obstructive pulmonary disease) (CMS/HCC)     Diabetes (CMS/HCC)     MI (myocardial infarction) (CMS/[facility])        Surgical History  History reviewed. No pertinent  "surgical history.     Social History  He reports that he has never smoked. His smokeless tobacco use includes chew. He reports that he does not drink alcohol and does not use drugs.    Family History  No family history on file.     Allergies  Patient has no known allergies.    Review of Systems   All other systems reviewed and are negative.       Physical Exam  Constitutional:       Appearance: He is obese.   HENT:      Head: Normocephalic and atraumatic.      Mouth/Throat:      Mouth: Mucous membranes are moist.   Eyes:      Extraocular Movements: Extraocular movements intact.      Pupils: Pupils are equal, round, and reactive to light.   Cardiovascular:      Rate and Rhythm: Normal rate and regular rhythm.      Pulses: Normal pulses.      Heart sounds: Normal heart sounds.   Pulmonary:      Effort: Pulmonary effort is normal.      Breath sounds: Normal breath sounds.   Abdominal:      General: Bowel sounds are normal. There is distension.      Palpations: Abdomen is soft.      Tenderness: There is no abdominal tenderness.   Musculoskeletal:      Right lower leg: Edema present.      Left lower leg: Edema present.   Skin:     General: Skin is warm.   Neurological:      General: No focal deficit present.      Mental Status: He is alert and oriented to person, place, and time.   Psychiatric:         Mood and Affect: Mood normal.          Last Recorded Vitals  Blood pressure 112/75, pulse 85, temperature 36.7 °C (98.1 °F), temperature source Temporal, resp. rate 23, height 1.88 m (6' 2\"), weight 141 kg (310 lb), SpO2 97 %.    Relevant Results      Scheduled medications  aspirin, 162 mg, oral, Daily   Or  aspirin, 150 mg, rectal, Daily      Continuous medications     PRN medications  PRN medications: oxygen    Results for orders placed or performed during the hospital encounter of 10/30/23 (from the past 24 hour(s))   CBC and Auto Differential   Result Value Ref Range    WBC 10.5 4.4 - 11.3 x10*3/uL    nRBC 0.0 0.0 - " 0.0 /100 WBCs    RBC 4.10 (L) 4.50 - 5.90 x10*6/uL    Hemoglobin 11.0 (L) 13.5 - 17.5 g/dL    Hematocrit 35.9 (L) 41.0 - 52.0 %    MCV 88 80 - 100 fL    MCH 26.8 26.0 - 34.0 pg    MCHC 30.6 (L) 32.0 - 36.0 g/dL    RDW 14.0 11.5 - 14.5 %    Platelets 327 150 - 450 x10*3/uL    MPV 9.1 7.5 - 11.5 fL    Neutrophils % 81.9 40.0 - 80.0 %    Immature Granulocytes %, Automated 0.8 0.0 - 0.9 %    Lymphocytes % 10.7 13.0 - 44.0 %    Monocytes % 5.2 2.0 - 10.0 %    Eosinophils % 0.8 0.0 - 6.0 %    Basophils % 0.6 0.0 - 2.0 %    Neutrophils Absolute 8.59 (H) 1.20 - 7.70 x10*3/uL    Immature Granulocytes Absolute, Automated 0.08 0.00 - 0.70 x10*3/uL    Lymphocytes Absolute 1.12 (L) 1.20 - 4.80 x10*3/uL    Monocytes Absolute 0.55 0.10 - 1.00 x10*3/uL    Eosinophils Absolute 0.08 0.00 - 0.70 x10*3/uL    Basophils Absolute 0.06 0.00 - 0.10 x10*3/uL   Comprehensive metabolic panel   Result Value Ref Range    Glucose 156 (H) 74 - 99 mg/dL    Sodium 142 136 - 145 mmol/L    Potassium 3.7 3.5 - 5.3 mmol/L    Chloride 104 98 - 107 mmol/L    Bicarbonate 27 21 - 32 mmol/L    Anion Gap 15 10 - 20 mmol/L    Urea Nitrogen 19 6 - 23 mg/dL    Creatinine 1.37 (H) 0.50 - 1.30 mg/dL    eGFR 62 >60 mL/min/1.73m*2    Calcium 8.8 8.6 - 10.3 mg/dL    Albumin 4.0 3.4 - 5.0 g/dL    Alkaline Phosphatase 104 33 - 120 U/L    Total Protein 6.9 6.4 - 8.2 g/dL    AST 20 9 - 39 U/L    Bilirubin, Total 0.6 0.0 - 1.2 mg/dL    ALT 31 10 - 52 U/L   Troponin I, High Sensitivity   Result Value Ref Range    Troponin I, High Sensitivity 110 (HH) 0 - 20 ng/L   Protime-INR   Result Value Ref Range    Protime 12.1 9.8 - 12.8 seconds    INR 1.1 0.9 - 1.1   APTT   Result Value Ref Range    aPTT 30 27 - 38 seconds   Blood Gas Venous Full Panel   Result Value Ref Range    POCT pH, Venous 7.38 7.33 - 7.43 pH    POCT pCO2, Venous 49 41 - 51 mm Hg    POCT pO2, Venous 48 (H) 35 - 45 mm Hg    POCT SO2, Venous 79 (H) 45 - 75 %    POCT Oxy Hemoglobin, Venous 76.2 (H) 45.0 - 75.0 %     POCT Hematocrit Calculated, Venous 35.0 (L) 41.0 - 52.0 %    POCT Sodium, Venous 139 136 - 145 mmol/L    POCT Potassium, Venous 3.9 3.5 - 5.3 mmol/L    POCT Chloride, Venous 106 98 - 107 mmol/L    POCT Ionized Calicum, Venous 1.17 1.10 - 1.33 mmol/L    POCT Glucose, Venous 167 (H) 74 - 99 mg/dL    POCT Lactate, Venous 1.1 0.4 - 2.0 mmol/L    POCT Base Excess, Venous 3.1 (H) -2.0 - 3.0 mmol/L    POCT HCO3 Calculated, Venous 29.0 (H) 22.0 - 26.0 mmol/L    POCT Hemoglobin, Venous 11.5 (L) 13.5 - 17.5 g/dL    POCT Anion Gap, Venous 8.0 (L) 10.0 - 25.0 mmol/L    Patient Temperature 37.0 degrees Celsius    FiO2 0 %   B-Type Natriuretic Peptide   Result Value Ref Range     (H) 0 - 99 pg/mL   Influenza A, and B PCR   Result Value Ref Range    Flu A Result Not Detected Not Detected    Flu B Result Not Detected Not Detected   Sars-CoV-2 PCR, Symptomatic   Result Value Ref Range    Coronavirus 2019, PCR Not Detected Not Detected   Troponin I, High Sensitivity   Result Value Ref Range    Troponin I, High Sensitivity 197 (HH) 0 - 20 ng/L     CT angio chest for pulmonary embolism    Result Date: 10/30/2023  Interpreted By:  Matthew Bryson, STUDY: CT ANGIO CHEST FOR PULMONARY EMBOLISM;  10/30/2023 8:34 pm   INDICATION: Signs/Symptoms:hypoxic, chest pain.   COMPARISON: Chest radiography of 10/30/2023.   ACCESSION NUMBER(S): [id number]   ORDERING CLINICIAN: SHREE CROCKER   TECHNIQUE: Helical data acquisition of the chest was obtained after intravenous administration of 150 ccOmnipaque 350, as per PE protocol. Images were reformatted in coronal and sagittal planes. Axial and coronal maximum intensity projection (MIP) images were created and reviewed.   FINDINGS: POTENTIAL LIMITATIONS OF THE STUDY: The assessment is limited by respiratory motion and suboptimal contrast opacification of pulmonary arteries.Also limited secondary to photon starvation to large patient body habitus   HEART AND VESSELS: Very limited and non-  diagnostic study for the assessment of pulmonary embolism. If clinically warranted, a repeat CT pulmonary artery angiogram versus a nuclear V/Q scan can be obtained. Main pulmonary artery is dilated, suggesting pulmonary arterial hypertension.   The thoracic aorta normal in caliber and tortuous.There is mild scattered atherosclerosis present, including calcified and noncalcified plaques. Mild coronary artery calcifications are seen. Please note,the study is not optimized for evaluation of coronary arteries.   Heart is enlarged.   There is no pericardial effusion seen.   MEDIASTINUM AND JERI, LOWER NECK AND AXILLA: The visualized thyroid gland is within normal limits. No evidence of thoracic lymphadenopathy by CT criteria. Esophagus appears within normal limits as seen.   LUNGS AND AIRWAYS: The trachea and central airways are patent. No endobronchial lesion is seen.   Respiratory motion artifact limits evaluation of lung parenchyma. Interstitial thickening suggesting acute pulmonary interstitial edema/CHF. Bilateral peribronchovascular airspace opacities, right worse than left, may be secondary to cardiogenic alveolar edema or pneumonia, including possibly aspiration pneumonia. Bibasilar consolidative opacities may relate to atelectasis or pneumonia. Small to moderate right and trace left pleural effusions. No pneumothorax.     UPPER ABDOMEN: Left upper pole renal cortical cyst.       CHEST WALL AND OSSEOUS STRUCTURES: Chest wall is within normal limits. No acute osseous pathology.There are no suspicious osseous lesions.Multilevel mild chronic appearing compression deformities with mild thoracic kyphoscoliosis, DISH.       Very limited and non- diagnostic study for the assessment of pulmonary embolism. If clinically warranted, a repeat CT pulmonary artery angiogram versus a nuclear V/Q scan can be obtained.   Respiratory motion artifact limits evaluation of lung parenchyma. Interstitial thickening suggesting acute  pulmonary interstitial edema/CHF.   Bilateral peribronchovascular airspace opacities, right worse than left, may be secondary to cardiogenic alveolar edema or pneumonia, including possibly aspiration pneumonia. Bibasilar consolidative opacities may relate to atelectasis or pneumonia.   Small to moderate right and trace left pleural effusions. No pneumothorax.   Cardiomegaly.   Thoracic aortic and coronary artery atherosclerosis.   Additional findings as discussed above.   MACRO: None   Signed by: Matthew Bryson 10/30/2023 9:23 PM Dictation workstation:   [id number]    XR chest 1 view    Result Date: 10/30/2023  Interpreted By:  Anant Spann, STUDY: XR CHEST 1 VIEW;  10/30/2023 5:57 pm   INDICATION: Signs/Symptoms:hypoxic, chest pain.   COMPARISON: March 28, 2020 chest radiograph   ACCESSION NUMBER(S): [id number]   ORDERING CLINICIAN: SHREE CROCKER   FINDINGS: AP radiograph of the chest was provided.   Overlapping radiopaque leads and tubing.   CARDIOMEDIASTINAL SILHOUETTE: Unchanged prominent cardiomediastinal silhouette.   LUNGS: Newly seen blunting of the costophrenic angles most compatible with effusions. There is prominence of the bronchovascular interstitium which could be due to low lung volumes and atelectasis versus vascular congestion/edema or interstitial infiltrates.   ABDOMEN: No remarkable upper abdominal findings.   BONES: No acute osseous changes.       1.  Suspect left-greater-than-right effusions with adjacent atelectasis. 2. Prominence of the bronchovascular interstitium may be due to atelectasis, interstitial infiltrates or mild edema.       MACRO: None   Signed by: Anant Spann 10/30/2023 6:22 PM Dictation workstation:   [id number]    ECG 12 lead (Clinic Performed)    Result Date: 10/5/2023  Normal sinus rhythm Prolonged QT Abnormal ECG When compared with ECG of 31-MAR-2023 12:42, No significant change was found Confirmed by Ivonne Gaona ([id number]) on 10/5/2023 1:33:30 PM         Assessment/Plan     Corey Jj is a 53 y.o. male with past medical history of HFpEF (EF 55 to 60%), CAD status post PCI to RCA with ANSHUL, hypertension, diabetes, depression/anxiety, MARV on CPAP, DM, that presents to [facility] for AHRF 2/2 acute pulmonary edema from ADHF.      #AHRF 2/2 acute pulmonary edema, R>L pleural effusions with atelectasis    #ADHF, AHA/ACC stage c, NYHA class 2-3    #elevated troponins, likely demand ischemia 2/2 above   -weaned from bipap, now on 2-3L NC. Wean as tolerated to b/l of RA   -BNP elevated, trops 110>197 will continue to trend. EKGs with significant ischemic changes   -CTPE: limited study w/out PE. Pulmonary edema, R>L pleural effusions, cardiomegaly   -previous TTE 3/23: EF 55-60%, impaired relaxation, eleavted LAP, asymetric LVH  -will continue diuresis IV 80mg BID   -IS, acapella, duonebs   -1500cc fluid restriction   -daily wts, strict I/os, monitor electrolytes, on tele     Chronic medical conditions:     #CAD sp PCI w Anshul: continue home DAPT and statin   #MARV: home CPAP  #HTN: home meds  #DM: SSI  #MDD/BEENA: home meds     F: fluid restrict   E: PRN   N: cardiac   GI: none   DVT: lovenox and SCDs  CODE: FULL    Dispo: admitted for AHRF 2/2 acute pulmonary edema from ADHF.  eLOS>48hrs.       Cliff Gilliam DO

## 2023-11-01 ENCOUNTER — PHARMACY VISIT (OUTPATIENT)
Dept: PHARMACY | Facility: CLINIC | Age: 53
End: 2023-11-01

## 2023-11-01 VITALS
HEART RATE: 83 BPM | OXYGEN SATURATION: 98 % | WEIGHT: 315 LBS | TEMPERATURE: 98.2 F | HEIGHT: 74 IN | BODY MASS INDEX: 40.43 KG/M2 | RESPIRATION RATE: 20 BRPM | SYSTOLIC BLOOD PRESSURE: 114 MMHG | DIASTOLIC BLOOD PRESSURE: 70 MMHG

## 2023-11-01 LAB
ALBUMIN SERPL BCP-MCNC: 3.8 G/DL (ref 3.4–5)
ANION GAP SERPL CALC-SCNC: 12 MMOL/L (ref 10–20)
BUN SERPL-MCNC: 22 MG/DL (ref 6–23)
CALCIUM SERPL-MCNC: 8.9 MG/DL (ref 8.6–10.3)
CHLORIDE SERPL-SCNC: 102 MMOL/L (ref 98–107)
CO2 SERPL-SCNC: 31 MMOL/L (ref 21–32)
CREAT SERPL-MCNC: 1.33 MG/DL (ref 0.5–1.3)
ERYTHROCYTE [DISTWIDTH] IN BLOOD BY AUTOMATED COUNT: 14.1 % (ref 11.5–14.5)
GFR SERPL CREATININE-BSD FRML MDRD: 64 ML/MIN/1.73M*2
GLUCOSE BLD MANUAL STRIP-MCNC: 130 MG/DL (ref 74–99)
GLUCOSE BLD MANUAL STRIP-MCNC: 161 MG/DL (ref 74–99)
GLUCOSE SERPL-MCNC: 115 MG/DL (ref 74–99)
HAPTOGLOB SERPL-MCNC: 183 MG/DL (ref 37–246)
HCT VFR BLD AUTO: 35 % (ref 41–52)
HGB BLD-MCNC: 10.6 G/DL (ref 13.5–17.5)
MAGNESIUM SERPL-MCNC: 2.15 MG/DL (ref 1.6–2.4)
MCH RBC QN AUTO: 26.6 PG (ref 26–34)
MCHC RBC AUTO-ENTMCNC: 30.3 G/DL (ref 32–36)
MCV RBC AUTO: 88 FL (ref 80–100)
NRBC BLD-RTO: 0 /100 WBCS (ref 0–0)
PHOSPHATE SERPL-MCNC: 4.1 MG/DL (ref 2.5–4.9)
PLATELET # BLD AUTO: 290 X10*3/UL (ref 150–450)
PMV BLD AUTO: 9.1 FL (ref 7.5–11.5)
POTASSIUM SERPL-SCNC: 3.7 MMOL/L (ref 3.5–5.3)
RBC # BLD AUTO: 3.99 X10*6/UL (ref 4.5–5.9)
SODIUM SERPL-SCNC: 141 MMOL/L (ref 136–145)
WBC # BLD AUTO: 7 X10*3/UL (ref 4.4–11.3)

## 2023-11-01 PROCEDURE — 94660 CPAP INITIATION&MGMT: CPT

## 2023-11-01 PROCEDURE — 2500000001 HC RX 250 WO HCPCS SELF ADMINISTERED DRUGS (ALT 637 FOR MEDICARE OP)

## 2023-11-01 PROCEDURE — 2500000004 HC RX 250 GENERAL PHARMACY W/ HCPCS (ALT 636 FOR OP/ED)

## 2023-11-01 PROCEDURE — 99239 HOSP IP/OBS DSCHRG MGMT >30: CPT

## 2023-11-01 PROCEDURE — 82947 ASSAY GLUCOSE BLOOD QUANT: CPT

## 2023-11-01 PROCEDURE — 2500000004 HC RX 250 GENERAL PHARMACY W/ HCPCS (ALT 636 FOR OP/ED): Performed by: STUDENT IN AN ORGANIZED HEALTH CARE EDUCATION/TRAINING PROGRAM

## 2023-11-01 PROCEDURE — 2500000005 HC RX 250 GENERAL PHARMACY W/O HCPCS: Performed by: STUDENT IN AN ORGANIZED HEALTH CARE EDUCATION/TRAINING PROGRAM

## 2023-11-01 PROCEDURE — 80069 RENAL FUNCTION PANEL: CPT

## 2023-11-01 PROCEDURE — RXMED WILLOW AMBULATORY MEDICATION CHARGE

## 2023-11-01 PROCEDURE — 2500000002 HC RX 250 W HCPCS SELF ADMINISTERED DRUGS (ALT 637 FOR MEDICARE OP, ALT 636 FOR OP/ED)

## 2023-11-01 PROCEDURE — 83735 ASSAY OF MAGNESIUM: CPT

## 2023-11-01 PROCEDURE — 85027 COMPLETE CBC AUTOMATED: CPT

## 2023-11-01 PROCEDURE — 96372 THER/PROPH/DIAG INJ SC/IM: CPT

## 2023-11-01 PROCEDURE — 36415 COLL VENOUS BLD VENIPUNCTURE: CPT

## 2023-11-01 RX ORDER — TORSEMIDE 20 MG/1
20 TABLET ORAL DAILY
Status: DISCONTINUED | OUTPATIENT
Start: 2023-11-01 | End: 2023-11-01 | Stop reason: HOSPADM

## 2023-11-01 RX ORDER — TORSEMIDE 20 MG/1
20 TABLET ORAL DAILY
Qty: 30 TABLET | Refills: 0 | Status: SHIPPED | OUTPATIENT
Start: 2023-11-01 | End: 2023-11-01 | Stop reason: SDUPTHER

## 2023-11-01 RX ORDER — TORSEMIDE 20 MG/1
40 TABLET ORAL DAILY
Qty: 60 TABLET | Refills: 0 | Status: SHIPPED | OUTPATIENT
Start: 2023-11-01 | End: 2023-11-27 | Stop reason: SDUPTHER

## 2023-11-01 RX ADMIN — BUPROPION HYDROCHLORIDE 100 MG: 100 TABLET, FILM COATED ORAL at 09:27

## 2023-11-01 RX ADMIN — SERTRALINE HYDROCHLORIDE 100 MG: 50 TABLET ORAL at 09:27

## 2023-11-01 RX ADMIN — TORSEMIDE 20 MG: 20 TABLET ORAL at 12:16

## 2023-11-01 RX ADMIN — CLOPIDOGREL BISULFATE 75 MG: 75 TABLET ORAL at 09:27

## 2023-11-01 RX ADMIN — FUROSEMIDE 80 MG: 10 INJECTION, SOLUTION INTRAMUSCULAR; INTRAVENOUS at 09:33

## 2023-11-01 RX ADMIN — DILTIAZEM HYDROCHLORIDE 180 MG: 180 CAPSULE, COATED, EXTENDED RELEASE ORAL at 09:27

## 2023-11-01 RX ADMIN — INSULIN LISPRO 2 UNITS: 100 INJECTION, SOLUTION INTRAVENOUS; SUBCUTANEOUS at 11:57

## 2023-11-01 RX ADMIN — ENOXAPARIN SODIUM 40 MG: 40 INJECTION SUBCUTANEOUS at 00:00

## 2023-11-01 RX ADMIN — SENNOSIDES AND DOCUSATE SODIUM 2 TABLET: 8.6; 5 TABLET ORAL at 09:27

## 2023-11-01 RX ADMIN — ASPIRIN 81 MG: 81 TABLET, COATED ORAL at 09:25

## 2023-11-01 RX ADMIN — ATORVASTATIN CALCIUM 80 MG: 80 TABLET, FILM COATED ORAL at 09:26

## 2023-11-01 RX ADMIN — Medication 3 L/MIN: at 09:05

## 2023-11-01 ASSESSMENT — COGNITIVE AND FUNCTIONAL STATUS - GENERAL
DAILY ACTIVITIY SCORE: 24
MOBILITY SCORE: 24

## 2023-11-01 ASSESSMENT — PAIN - FUNCTIONAL ASSESSMENT
PAIN_FUNCTIONAL_ASSESSMENT: 0-10
PAIN_FUNCTIONAL_ASSESSMENT: 0-10

## 2023-11-01 ASSESSMENT — ACTIVITIES OF DAILY LIVING (ADL): LACK_OF_TRANSPORTATION: NO

## 2023-11-01 ASSESSMENT — PAIN SCALES - GENERAL
PAINLEVEL_OUTOF10: 0 - NO PAIN
PAINLEVEL_OUTOF10: 0 - NO PAIN

## 2023-11-01 NOTE — CARE PLAN
The patient's goals for the shift include to sleep    The clinical goals for the shift include pt will maintain sats greater or equal to 92%      Problem: Fall/Injury  Goal: Be free from injury by end of the shift  Outcome: Progressing     Problem: Pain - Adult  Goal: Verbalizes/displays adequate comfort level or baseline comfort level  Outcome: Progressing     Problem: Safety - Adult  Goal: Free from fall injury  Outcome: Progressing     Problem: Chronic Conditions and Co-morbidities  Goal: Patient's chronic conditions and co-morbidity symptoms are monitored and maintained or improved  Outcome: Progressing     Problem: Heart Failure  Goal: Report improvement of dyspnea/breathlessness this shift  Outcome: Progressing

## 2023-11-01 NOTE — CARE PLAN
The patient's goals for the shift include to sleep    The clinical goals for the shift include pt will be discharged    Over the shift, the patient did not make progress toward the following goals. Barriers to progression include the patient. Recommendations to address these barriers include patient education on discharge instructions.     within normal limits

## 2023-11-01 NOTE — DISCHARGE SUMMARY
Discharge Diagnosis  Acute on chronic congestive heart failure, unspecified heart failure type (CMS/HCC)    Issues Requiring Follow-Up  Heart Failure    Test Results Pending At Discharge  Pending Labs       No current pending labs.            Hospital Course  Corey Jj is a 53 y.o. male with past medical history of HFpEF (EF 55 to 60%), CAD status post PCI to RCA with MARIANA, hypertension, diabetes, depression/anxiety, MARV on CPAP, DM, that presents to [facility] ED for acute shortness of breath.  Patient developed acute dyspnea today and was found to be satting at 75% by EMS and was started on CPAP upon arrival.  Patient has a history of CHF and has had increased dyspnea as well as abdominal distention change in bowel movements and lower extremity edema for the past couple weeks.  Was told by his PCP to increase his home dose of Lasix if developing symptoms but has since developed worsening dyspnea which prompted ED visit.  He denies any associated chest pain, palpitations, cough, lightheadedness, syncope, changes in urination, fever or chills.  He denies any recent travel or sick contacts.  He states that he is compliant with his medications, but noncompliant with fluid restricted and low-salt diet.  patient is unsure of his baseline dry weight.  He does not wear oxygen at home and is compliant with CPAP at night.     In ED the patient Asprin was given and CT angio was given to evaluate for PE.  He was also given an 80 mg IV Lasix.    When on the floor the patient continued to be diuresed with IV lasix and is on 3L NC. On 11/1 the patient improved to 2LNC and he will be evaluated today for home oxygen.  Started on torsemide and plan for discharge       Pertinent Physical Exam At Time of Discharge  Physical Exam  Constitutional:       Appearance: He is obese.   HENT:      Head: Normocephalic and atraumatic.      Mouth/Throat:      Mouth: Mucous membranes are moist.   Eyes:      Extraocular Movements: Extraocular  "movements intact.      Pupils: Pupils are equal, round, and reactive to light.   Cardiovascular:      Rate and Rhythm: Normal rate and regular rhythm. JVD present     Pulses: Normal pulses.      Heart sounds: Normal heart sounds.   Pulmonary:      Effort: Pulmonary effort is normal.      Breath sounds: Normal breath sounds.   Abdominal:      General: Bowel sounds are normal.      Palpations: Abdomen is soft.      Tenderness: There is no abdominal tenderness.   Musculoskeletal:      Bilatearl lower leg: Edema present up past the knee  Skin:     General: Skin is warm.   Neurological:      General: No focal deficit present.      Mental Status: He is alert and oriented to person, place, and time.   Psychiatric:         Mood and Affect: Mood normal.      Last Recorded Vitals  Blood pressure 134/78, pulse 86, temperature 36.4 °C (97.5 °F), temperature source Temporal, resp. rate 20, height 1.88 m (6' 2\"), weight (!) 153 kg (338 lb 3 oz), SpO2 96 %.  Intake/Output last 3 Shifts:  I/O last 3 completed shifts:  In: - (0 mL/kg)   Out: 1800 (11.7 mL/kg) [Urine:1800 (0.3 mL/kg/hr)]  Weight: 153.4 kg     Home Medications     Medication List      START taking these medications     torsemide 20 mg tablet; Commonly known as: Demadex; Take 2 tablets (40   mg) by mouth once daily.     CHANGE how you take these medications     dilTIAZem  mg 24 hr capsule; Commonly known as: Cardizem CD; Take   1 capsule (180 mg) by mouth once daily.; What changed: when to take this     CONTINUE taking these medications     albuterol 108 (90 Base) MCG/ACT inhaler   aspirin 81 mg EC tablet   atorvastatin 80 mg tablet; Commonly known as: Lipitor   buPROPion 100 mg tablet; Commonly known as: Wellbutrin   clopidogrel 75 mg tablet; Commonly known as: Plavix   nortriptyline 75 mg capsule; Commonly known as: Pamelor   potassium chloride ER 10 mEq ER capsule; Commonly known as: Micro-K   sertraline 100 mg tablet; Commonly known as: Zoloft     STOP taking " these medications     furosemide 40 mg tablet; Commonly known as: Lasix       Outpatient Follow-Up  Future Appointments   Date Time Provider Department Center   11/27/2023 11:00 AM JOSEPH Foley-CNP [id number]   4/16/2024 10:00 AM Ivonne Gaona MD [id number]       Rocco Ling DO

## 2023-11-01 NOTE — PROGRESS NOTES
11/01/23 1208   Discharge Planning   Living Arrangements Spouse/significant other   Support Systems Spouse/significant other   Assistance Needed X3, independent in ADLS, No DME, No O2 at home but has a CPAP   Type of Residence Private residence   Number of Stairs to Enter Residence 0   Number of Stairs Within Residence 0   Do you have animals or pets at home? Yes   Type of Animals or Pets 1 dog   Who is requesting discharge planning? Provider   Home or Post Acute Services None   Patient expects to be discharged to: Home with spouse, denies need for HHC, currently on 2L NC and will need O2 eval prior to dc.   Financial Resource Strain   How hard is it for you to pay for the very basics like food, housing, medical care, and heating? Not very   Housing Stability   In the last 12 months, was there a time when you were not able to pay the mortgage or rent on time? N   In the last 12 months, how many places have you lived? 1   In the last 12 months, was there a time when you did not have a steady place to sleep or slept in a shelter (including now)? N   Transportation Needs   In the past 12 months, has lack of transportation kept you from medical appointments or from getting medications? no   In the past 12 months, has lack of transportation kept you from meetings, work, or from getting things needed for daily living? No

## 2023-11-01 NOTE — DISCHARGE INSTRUCTIONS
1) Please, take your home medications as instructed after being discharged from the hospital.    NEW MEDICATIONS:  Start Torsemide 40 mg daily to prevent swelling and shortness of breath.     NEW MEDICATION CHANGES:  Discontinue Furosemide 40 mg tablet    2) Please, follow-up with your primary care provider within 7 to 14 days after leaving the hospital. /appointment services has been requested to make an appointment for you, however if you do not hear back from them within 1 to 2 days, please call your primary physician's office to schedule an appointment. Bring your photo ID and insurance card to your appointment.   [facility]  services can be reached at [phone] and appointment services can be reached at [phone].    - Please, call   or appointment services and schedule a follow-up with your PCP within 1-2 weeks after you leave the hospital.    3) If you experience any worsening symptoms or have any concerns, please contact your primary care provider to schedule an appointment. If you cannot get in touch with your primary care physician, please return to the nearest emergency room or urgent care clinic for an evaluation and treatment.    Thank you for choosing [facility] and allowing us to partake in your medical care!    - Your primary care inpatient team.

## 2023-11-27 DIAGNOSIS — I50.30 HEART FAILURE WITH PRESERVED LEFT VENTRICULAR FUNCTION (MULTI): ICD-10-CM

## 2023-11-28 RX ORDER — TORSEMIDE 20 MG/1
40 TABLET ORAL DAILY
Qty: 60 TABLET | Refills: 4 | Status: SHIPPED | OUTPATIENT
Start: 2023-11-28 | End: 2024-04-22 | Stop reason: SDUPTHER

## 2024-01-16 ENCOUNTER — OFFICE VISIT (OUTPATIENT)
Dept: PULMONOLOGY | Facility: CLINIC | Age: 54
End: 2024-01-16
Payer: COMMERCIAL

## 2024-01-16 VITALS
BODY MASS INDEX: 42.57 KG/M2 | HEART RATE: 92 BPM | DIASTOLIC BLOOD PRESSURE: 67 MMHG | SYSTOLIC BLOOD PRESSURE: 117 MMHG | OXYGEN SATURATION: 94 % | WEIGHT: 315 LBS

## 2024-01-16 DIAGNOSIS — G47.33 OSA (OBSTRUCTIVE SLEEP APNEA): ICD-10-CM

## 2024-01-16 DIAGNOSIS — J44.9 CHRONIC OBSTRUCTIVE PULMONARY DISEASE, UNSPECIFIED COPD TYPE (MULTI): Primary | ICD-10-CM

## 2024-01-16 DIAGNOSIS — R06.02 SOB (SHORTNESS OF BREATH) ON EXERTION: ICD-10-CM

## 2024-01-16 DIAGNOSIS — J84.9 ILD (INTERSTITIAL LUNG DISEASE) (MULTI): ICD-10-CM

## 2024-01-16 PROCEDURE — 99204 OFFICE O/P NEW MOD 45 MIN: CPT | Performed by: NURSE PRACTITIONER

## 2024-01-16 PROCEDURE — 3008F BODY MASS INDEX DOCD: CPT | Performed by: NURSE PRACTITIONER

## 2024-01-16 RX ORDER — ALBUTEROL SULFATE 90 UG/1
2 AEROSOL, METERED RESPIRATORY (INHALATION) EVERY 6 HOURS PRN
Qty: 18 G | Refills: 11 | Status: SHIPPED | OUTPATIENT
Start: 2024-01-16 | End: 2024-02-15

## 2024-01-16 ASSESSMENT — ENCOUNTER SYMPTOMS: SHORTNESS OF BREATH: 1

## 2024-01-16 NOTE — PATIENT INSTRUCTIONS
Mr. Jj it was a pleasure seeing you in the office today. We discussed the following:    I have ordered you a breathing test  I would also like you to have a chest CT  Please use your albuterol as needed  Please continue your CPAP    Follow up in 2 months after your testing

## 2024-01-16 NOTE — PROGRESS NOTES
Subjective   Patient ID: Corey Jj is a 53 y.o. male who presents for No chief complaint on file..  HPI    New patient here today to establish care. He was hospitalized for CHF/COPD/pneumonia in November. He is feeling better but continues to have SOB. He has never been formally diagnosed with COPD. 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 Patient states it is set on 19. No oxygen use.     Previous pulmonary history:   He has no history of recurrent infections, or lung disease as a child.  He had no previous lung hx, never on oxygen or inhaler therapy. He was previously told he has COPD. He currently is on no supplemental oxygen.       Inhalers/nebulized medications: Albuterol      Hospitalization History:  He has been hospitalized over the last year for breathing related problem.     Sleep history:  History of MARV on CPAP at 19     Comorbidities:   CHF    SH:  smoking: former  drinking: none  illicit drug use: none     Occupation: (Full questionnaire on exposures obtained, discussed with the patient and scanned to EMR)  worked as saw mill   known exposure to asbestos, silica or beryllium     Family History:  No family history of lung diseases or cancer     Imaging history: (I have personally reviewed the imaging below)   10/31/23 Chest CT peribronchovascular opacities, and consolidative opacities. Small to moderate pleural effusions. Cardiomegaly    PFTs:  // -> FEV1/FVC ratio/FEV1 (no BD response)/FVC/DLCO /TLC/RV to TLC ratio     6 MWTs: None on record     Lung biopsy: None on record     Echo: cath 3/2023   -> Normal EF, diastolic dysfunction, with normal LA, RV size and function     Labs:  : Hb 10.6 , Eos <250, Bicarb , Creat     Review of Systems   Respiratory:  Positive for shortness of breath.    All other systems reviewed and are negative.      Objective   Physical Exam  Vitals and nursing note reviewed.   Constitutional:       Appearance: Normal appearance. He is  obese.   HENT:      Head: Normocephalic.      Nose: Nose normal.   Eyes:      Pupils: Pupils are equal, round, and reactive to light.   Cardiovascular:      Rate and Rhythm: Normal rate.   Pulmonary:      Effort: Pulmonary effort is normal.      Breath sounds: Normal breath sounds.   Neurological:      General: No focal deficit present.      Mental Status: He is alert and oriented to person, place, and time. Mental status is at baseline.   Psychiatric:         Mood and Affect: Mood normal.         Behavior: Behavior normal.         Thought Content: Thought content normal.         Judgment: Judgment normal.         Assessment/Plan     # SOB   - Hospitalized in 10/23 for pneumonia and CHF   - he had a CT showing calcified plaques and opacities   - CT also showed bilateral pleural effusions   - He has albuterol to use   - will order a new chest CT   - will order a pft    #CHF   - Hospitalized in 10/23    - He does see cardiology      # MARV   - he is on CPAP   - He states he has a link cpap and it is set on 19    MrJossue Jj it was a pleasure seeing you in the office today. We discussed the following:    I have ordered you a breathing test  I would also like you to have a chest CT  Please use your albuterol as needed  Please continue your CPAP    Follow up in 2 months after your testing       JOSEPH Hill-CNP 01/16/24 8:37 AM

## 2024-02-06 ENCOUNTER — HOSPITAL ENCOUNTER (OUTPATIENT)
Dept: RESPIRATORY THERAPY | Facility: HOSPITAL | Age: 54
Discharge: HOME | End: 2024-02-06
Payer: COMMERCIAL

## 2024-02-06 DIAGNOSIS — J44.9 CHRONIC OBSTRUCTIVE PULMONARY DISEASE, UNSPECIFIED COPD TYPE (MULTI): ICD-10-CM

## 2024-02-06 PROCEDURE — 94060 EVALUATION OF WHEEZING: CPT

## 2024-02-06 PROCEDURE — 94060 EVALUATION OF WHEEZING: CPT | Performed by: PEDIATRICS

## 2024-02-06 PROCEDURE — 94729 DIFFUSING CAPACITY: CPT

## 2024-02-06 PROCEDURE — 94760 N-INVAS EAR/PLS OXIMETRY 1: CPT

## 2024-02-06 PROCEDURE — 94729 DIFFUSING CAPACITY: CPT | Performed by: PEDIATRICS

## 2024-02-06 PROCEDURE — 94664 DEMO&/EVAL PT USE INHALER: CPT

## 2024-02-06 PROCEDURE — 94726 PLETHYSMOGRAPHY LUNG VOLUMES: CPT

## 2024-02-06 PROCEDURE — 94726 PLETHYSMOGRAPHY LUNG VOLUMES: CPT | Performed by: PEDIATRICS

## 2024-02-15 LAB
MGC ASCENT PFT - FEV1 - POST: 1.73
MGC ASCENT PFT - FEV1 - PRE: 1.7
MGC ASCENT PFT - FEV1 - PREDICTED: 3.86
MGC ASCENT PFT - FVC - POST: 2.33
MGC ASCENT PFT - FVC - PRE: 2.32
MGC ASCENT PFT - FVC - PREDICTED: 4.91

## 2024-03-06 ENCOUNTER — APPOINTMENT (OUTPATIENT)
Dept: RADIOLOGY | Facility: HOSPITAL | Age: 54
End: 2024-03-06
Payer: COMMERCIAL

## 2024-03-06 ENCOUNTER — HOSPITAL ENCOUNTER (EMERGENCY)
Facility: HOSPITAL | Age: 54
Discharge: HOME | End: 2024-03-06
Attending: STUDENT IN AN ORGANIZED HEALTH CARE EDUCATION/TRAINING PROGRAM
Payer: COMMERCIAL

## 2024-03-06 ENCOUNTER — APPOINTMENT (OUTPATIENT)
Dept: CARDIOLOGY | Facility: HOSPITAL | Age: 54
End: 2024-03-06
Payer: COMMERCIAL

## 2024-03-06 VITALS
DIASTOLIC BLOOD PRESSURE: 62 MMHG | RESPIRATION RATE: 18 BRPM | TEMPERATURE: 98.7 F | HEIGHT: 74 IN | WEIGHT: 315 LBS | HEART RATE: 90 BPM | SYSTOLIC BLOOD PRESSURE: 137 MMHG | OXYGEN SATURATION: 97 % | BODY MASS INDEX: 40.43 KG/M2

## 2024-03-06 DIAGNOSIS — J18.9 PNEUMONIA DUE TO INFECTIOUS ORGANISM, UNSPECIFIED LATERALITY, UNSPECIFIED PART OF LUNG: Primary | ICD-10-CM

## 2024-03-06 LAB
FLUAV RNA RESP QL NAA+PROBE: NOT DETECTED
FLUBV RNA RESP QL NAA+PROBE: NOT DETECTED
RSV RNA RESP QL NAA+PROBE: NOT DETECTED
SARS-COV-2 RNA RESP QL NAA+PROBE: NOT DETECTED

## 2024-03-06 PROCEDURE — 87637 SARSCOV2&INF A&B&RSV AMP PRB: CPT | Performed by: STUDENT IN AN ORGANIZED HEALTH CARE EDUCATION/TRAINING PROGRAM

## 2024-03-06 PROCEDURE — 71045 X-RAY EXAM CHEST 1 VIEW: CPT | Mod: FOREIGN READ | Performed by: RADIOLOGY

## 2024-03-06 PROCEDURE — 93005 ELECTROCARDIOGRAM TRACING: CPT

## 2024-03-06 PROCEDURE — 99283 EMERGENCY DEPT VISIT LOW MDM: CPT | Mod: 25

## 2024-03-06 PROCEDURE — 71045 X-RAY EXAM CHEST 1 VIEW: CPT

## 2024-03-06 RX ORDER — DOXYCYCLINE 100 MG/1
100 CAPSULE ORAL 2 TIMES DAILY
Qty: 10 CAPSULE | Refills: 0 | Status: SHIPPED | OUTPATIENT
Start: 2024-03-06 | End: 2024-03-11

## 2024-03-06 ASSESSMENT — PAIN - FUNCTIONAL ASSESSMENT: PAIN_FUNCTIONAL_ASSESSMENT: 0-10

## 2024-03-06 ASSESSMENT — PAIN SCALES - GENERAL: PAINLEVEL_OUTOF10: 0 - NO PAIN

## 2024-03-06 NOTE — ED PROVIDER NOTES
HPI   Chief Complaint   Patient presents with    Cough       Corey is a 53 yr old white male presenting to the ED for persistent cough. Presenting with his wife today. Cough began 4 days ago, felt that yesterday he was doing better, but this morning he felt worse and that's why he came to the ED today. Past history of collapse lung on the left side. Per wife and patient, he has had intermittent episodes of sweating and fevers. Cough is mostly dry.   Admits to nasal congestion, nausea and a single episode of vomiting, headache, and body aches (though patient states that this is base line). Denies chills, rhinitis, chest congestion, and chest pain.                          Ashley Coma Scale Score: 15                     Patient History   Past Medical History:   Diagnosis Date    COPD (chronic obstructive pulmonary disease) (CMS/[facility])     Diabetes (CMS/[facility])     MI (myocardial infarction) (CMS/[facility])      History reviewed. No pertinent surgical history.  No family history on file.  Social History     Tobacco Use    Smoking status: Former     Types: Cigarettes    Smokeless tobacco: Current     Types: Chew   Vaping Use    Vaping Use: Never used   Substance Use Topics    Alcohol use: Never    Drug use: Never       Physical Exam   ED Triage Vitals [03/06/24 1340]   Temperature Heart Rate Respirations BP   37.1 °C (98.7 °F) 92 20 (!) 159/92      SpO2 Temp Source Heart Rate Source Patient Position   95 % Tympanic -- --      BP Location FiO2 (%)     -- --       Physical Exam  Constitutional:       Comments: No tender lymphadenopathy   HENT:      Head: Normocephalic.   Cardiovascular:      Rate and Rhythm: Normal rate and regular rhythm.      Heart sounds: Normal heart sounds.   Pulmonary:      Effort: Pulmonary effort is normal.      Breath sounds: Wheezing and rales present.   Musculoskeletal:      Cervical back: Full passive range of motion without pain and neck supple.   Neurological:      Mental Status: He is alert.          ED Course & MDM   Diagnoses as of 03/12/24 0004   Pneumonia due to infectious organism, unspecified laterality, unspecified part of lung       Medical Decision Making  This patient was seen with the [employer] student who wrote the HPI and PE.  Edits have been made to the above documentation by myself and I agree with the above documentation.  The MDM was written by this provider.    This is a 53-year-old male, presenting to the emergency department for 4 days of cough that has gotten worse today.  Patient states that yesterday he started feeling better, however today he felt significantly worse and so presents to the emergency department.  He denies any chest pain.  He denies any fevers or chills.  On physical exam, patient is resting comfortably in the bed, no acute distress.  Heart is regular rate and rhythm, lungs with some wheezing bilaterally.  Viral swab was negative.  Chest x-ray is concerning for possible bronchitis.  Vital signs are stable.  Patient will be discharged home with a prescription for doxycycline and advised to follow-up with his primary care provider.  He was advised to return to the emergency department for any worsening symptoms and was discharged home in stable condition.    Procedure  Procedures     Estephanie Pathak DO  03/12/24 0007

## 2024-03-07 LAB
ATRIAL RATE: 96 BPM
P AXIS: 71 DEGREES
P OFFSET: 203 MS
P ONSET: 133 MS
PR INTERVAL: 168 MS
Q ONSET: 217 MS
QRS COUNT: 16 BEATS
QRS DURATION: 96 MS
QT INTERVAL: 372 MS
QTC CALCULATION(BAZETT): 469 MS
QTC FREDERICIA: 435 MS
R AXIS: 42 DEGREES
T AXIS: 85 DEGREES
T OFFSET: 403 MS
VENTRICULAR RATE: 96 BPM

## 2024-04-08 DIAGNOSIS — I25.10 CORONARY ARTERY DISEASE INVOLVING NATIVE CORONARY ARTERY OF NATIVE HEART WITHOUT ANGINA PECTORIS: ICD-10-CM

## 2024-04-08 DIAGNOSIS — E78.2 MIXED HYPERLIPIDEMIA: ICD-10-CM

## 2024-04-08 DIAGNOSIS — I50.32 CHRONIC HEART FAILURE WITH PRESERVED EJECTION FRACTION (MULTI): ICD-10-CM

## 2024-04-08 DIAGNOSIS — J44.9 CHRONIC OBSTRUCTIVE PULMONARY DISEASE, UNSPECIFIED COPD TYPE (MULTI): ICD-10-CM

## 2024-04-08 DIAGNOSIS — I10 ESSENTIAL HYPERTENSION: ICD-10-CM

## 2024-04-08 RX ORDER — DILTIAZEM HYDROCHLORIDE 180 MG/1
180 CAPSULE, COATED, EXTENDED RELEASE ORAL DAILY
Qty: 90 CAPSULE | Refills: 1 | Status: SHIPPED | OUTPATIENT
Start: 2024-04-08 | End: 2025-04-08

## 2024-04-09 ENCOUNTER — OFFICE VISIT (OUTPATIENT)
Dept: PULMONOLOGY | Facility: CLINIC | Age: 54
End: 2024-04-09
Payer: COMMERCIAL

## 2024-04-09 VITALS
OXYGEN SATURATION: 93 % | DIASTOLIC BLOOD PRESSURE: 75 MMHG | BODY MASS INDEX: 42.36 KG/M2 | HEART RATE: 92 BPM | SYSTOLIC BLOOD PRESSURE: 140 MMHG | WEIGHT: 315 LBS

## 2024-04-09 DIAGNOSIS — G47.33 OSA (OBSTRUCTIVE SLEEP APNEA): ICD-10-CM

## 2024-04-09 DIAGNOSIS — I50.30 HEART FAILURE WITH PRESERVED LEFT VENTRICULAR FUNCTION (MULTI): ICD-10-CM

## 2024-04-09 DIAGNOSIS — J43.2 CENTRILOBULAR EMPHYSEMA (MULTI): Primary | ICD-10-CM

## 2024-04-09 PROCEDURE — 99214 OFFICE O/P EST MOD 30 MIN: CPT | Performed by: NURSE PRACTITIONER

## 2024-04-09 RX ORDER — FLUTICASONE FUROATE, UMECLIDINIUM BROMIDE AND VILANTEROL TRIFENATATE 100; 62.5; 25 UG/1; UG/1; UG/1
1 POWDER RESPIRATORY (INHALATION)
Qty: 60 EACH | Refills: 11 | Status: SHIPPED | OUTPATIENT
Start: 2024-04-09

## 2024-04-09 ASSESSMENT — ENCOUNTER SYMPTOMS: SHORTNESS OF BREATH: 1

## 2024-04-09 NOTE — PATIENT INSTRUCTIONS
Mr. Jj it was a pleasure seeing you in the office today. We discussed the following:    I am ordering you an every day inhaler this is called Trelegy to use it once a day  Please use your albuterol as needed  Please continue your CPAP  We will get you set up with CPAP so you can get supplies for your CPAP  Please continue to stay active    Follow up in 6 months

## 2024-04-09 NOTE — PROGRESS NOTES
Subjective   Patient ID: Corey Jj is a 53 y.o. male who presents for No chief complaint on file..  HPI    New patient here today to establish care. He was hospitalized for CHF/COPD/pneumonia in November. He is feeling better but continues to have SOB. He has never been formally diagnosed with COPD. 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 Patient states it is set on 19. No oxygen use.   04/09/24 he is here today for follow-up.  Unfortunately he was in the hospital with pneumonia 1 March.  Patient had a new PFT which shows an FEV1 of 44% predicted.  He has severe obstruction.  I am going to start him on Trelegy.  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    Previous pulmonary history:   He has no history of recurrent infections, or lung disease as a child.  He had no previous lung hx, never on oxygen or inhaler therapy. He was previously told he has COPD. He currently is on no supplemental oxygen.       Inhalers/nebulized medications: Albuterol      Hospitalization History:  He has been hospitalized over the last year for breathing related problem.     Sleep history:  History of MARV on CPAP at 19     Comorbidities:   CHF    SH:  smoking: former  drinking: none  illicit drug use: none     Occupation: (Full questionnaire on exposures obtained, discussed with the patient and scanned to EMR)  worked as saw mill   known exposure to asbestos, silica or beryllium     Family History:  No family history of lung diseases or cancer     Imaging history: (I have personally reviewed the imaging below)   10/31/23 Chest CT peribronchovascular opacities, and consolidative opacities. Small to moderate pleural effusions. Cardiomegaly    PFTs: shows severe restriction   // -> FEV1/FVC ratio/ .73 FEV1  44% (no BD response)/FVC 47% /DLCO /TLC/ 48% RV to TLC ratio     6 MWTs: None on record     Lung biopsy: None on record     Echo: cath 3/2023   ->  Normal EF, diastolic dysfunction, with normal LA, RV size and function     Labs:  : Hb 10.6 , Eos <250, Bicarb , Creat     Review of Systems   Respiratory:  Positive for shortness of breath.    All other systems reviewed and are negative.      Objective   Physical Exam  Vitals and nursing note reviewed.   Constitutional:       Appearance: Normal appearance. He is obese.   HENT:      Head: Normocephalic.      Nose: Nose normal.   Eyes:      Pupils: Pupils are equal, round, and reactive to light.   Cardiovascular:      Rate and Rhythm: Normal rate.   Pulmonary:      Effort: Pulmonary effort is normal.      Breath sounds: Normal breath sounds.   Neurological:      General: No focal deficit present.      Mental Status: He is alert and oriented to person, place, and time. Mental status is at baseline.   Psychiatric:         Mood and Affect: Mood normal.         Behavior: Behavior normal.         Thought Content: Thought content normal.         Judgment: Judgment normal.         Assessment/Plan     # SOB   - Hospitalized in 10/23 for pneumonia and CHF   - he had a CT showing calcified plaques and opacities   - CT also showed bilateral pleural effusions   - He has albuterol to use   - will order a new chest CT   - will order a pft  04/09/24 PFT shows severe restriction FEV1 is 44%.  I am going to start him on Trelegy 100 to see if we can improve his shortness of breath.  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        #CHF   - Hospitalized in 10/23    - He does see cardiology      # MARV   - he is on CPAP   - He states he has a link cpap and it is set on 19  4/09/24 he has an auto cpap 4-20 he needs set up with teresa.     Mr. Jj it was a pleasure seeing you in the office today. We discussed the following:    I am ordering you an every day inhaler this is called Trelegy to use it once a day  Please use your albuterol as needed  Please continue your CPAP  We will get you set up  with CPAP so you can get supplies for your CPAP  Please continue to stay active    Follow up in 6 months       CHAD Hill 04/09/24 8:07 AM

## 2024-04-22 DIAGNOSIS — I50.30 HEART FAILURE WITH PRESERVED LEFT VENTRICULAR FUNCTION (MULTI): ICD-10-CM

## 2024-04-22 RX ORDER — TORSEMIDE 20 MG/1
40 TABLET ORAL DAILY
Qty: 60 TABLET | Refills: 4 | Status: SHIPPED | OUTPATIENT
Start: 2024-04-22 | End: 2024-05-16 | Stop reason: SDUPTHER

## 2024-05-15 ENCOUNTER — APPOINTMENT (OUTPATIENT)
Dept: GENERAL RADIOLOGY | Age: 54
End: 2024-05-15

## 2024-05-15 ENCOUNTER — HOSPITAL ENCOUNTER (EMERGENCY)
Age: 54
Discharge: HOME | End: 2024-05-15
Attending: EMERGENCY MEDICINE

## 2024-05-15 VITALS
SYSTOLIC BLOOD PRESSURE: 137 MMHG | HEART RATE: 88 BPM | WEIGHT: 315 LBS | RESPIRATION RATE: 20 BRPM | DIASTOLIC BLOOD PRESSURE: 61 MMHG | TEMPERATURE: 98 F | OXYGEN SATURATION: 94 %

## 2024-05-15 DIAGNOSIS — S61.315A LACERATION OF LEFT RING FINGER WITHOUT FOREIGN BODY WITH DAMAGE TO NAIL, INITIAL ENCOUNTER: ICD-10-CM

## 2024-05-15 DIAGNOSIS — S62.609A COMMINUTED FRACTURE OF PHALANX OF FINGER: Primary | ICD-10-CM

## 2024-05-15 PROCEDURE — 2500000003 HC RX 250 WO HCPCS

## 2024-05-15 PROCEDURE — 73130 X-RAY EXAM OF HAND: CPT

## 2024-05-15 PROCEDURE — 6370000000 HC RX 637 (ALT 250 FOR IP): Performed by: PHYSICIAN ASSISTANT

## 2024-05-15 PROCEDURE — 99283 EMERGENCY DEPT VISIT LOW MDM: CPT

## 2024-05-15 RX ORDER — CEPHALEXIN 500 MG/1
500 CAPSULE ORAL 4 TIMES DAILY
Qty: 40 CAPSULE | Refills: 0 | Status: SHIPPED | OUTPATIENT
Start: 2024-05-15 | End: 2024-05-25

## 2024-05-15 RX ORDER — CEPHALEXIN 250 MG/1
500 CAPSULE ORAL ONCE
Status: COMPLETED | OUTPATIENT
Start: 2024-05-15 | End: 2024-05-15

## 2024-05-15 RX ORDER — OXYCODONE HYDROCHLORIDE AND ACETAMINOPHEN 5; 325 MG/1; MG/1
1 TABLET ORAL EVERY 6 HOURS PRN
Qty: 12 TABLET | Refills: 0 | Status: SHIPPED | OUTPATIENT
Start: 2024-05-15 | End: 2024-05-18

## 2024-05-15 RX ORDER — OXYCODONE HYDROCHLORIDE AND ACETAMINOPHEN 5; 325 MG/1; MG/1
1 TABLET ORAL ONCE
Status: COMPLETED | OUTPATIENT
Start: 2024-05-15 | End: 2024-05-15

## 2024-05-15 RX ADMIN — CEPHALEXIN 500 MG: 250 CAPSULE ORAL at 12:15

## 2024-05-15 RX ADMIN — LIDOCAINE HYDROCHLORIDE 20 ML: 10 INJECTION, SOLUTION INFILTRATION; PERINEURAL at 16:17

## 2024-05-15 RX ADMIN — OXYCODONE AND ACETAMINOPHEN 1 TABLET: 5; 325 TABLET ORAL at 12:15

## 2024-05-15 ASSESSMENT — PAIN DESCRIPTION - LOCATION: LOCATION: HAND

## 2024-05-15 ASSESSMENT — LIFESTYLE VARIABLES
HOW MANY STANDARD DRINKS CONTAINING ALCOHOL DO YOU HAVE ON A TYPICAL DAY: PATIENT DOES NOT DRINK
HOW OFTEN DO YOU HAVE A DRINK CONTAINING ALCOHOL: NEVER

## 2024-05-15 ASSESSMENT — PAIN DESCRIPTION - DESCRIPTORS: DESCRIPTORS: THROBBING

## 2024-05-15 ASSESSMENT — PAIN DESCRIPTION - ORIENTATION: ORIENTATION: LEFT

## 2024-05-15 ASSESSMENT — PAIN - FUNCTIONAL ASSESSMENT: PAIN_FUNCTIONAL_ASSESSMENT: ACTIVITIES ARE NOT PREVENTED

## 2024-05-15 NOTE — CONSULTS
Department of Orthopedic Surgery  Consult Note    Reason for Consult: Left ring finger injury    HISTORY OF PRESENT ILLNESS:       Patient is a right-hand-dominant 53 y.o. male who presents with a 1 day history of pain to the left ring finger.  Patient stated that his finger got caught in a wood  yesterday.  He presented to [facility] ED where he was stitched up and sent home on antibiotics, to follow-up with an orthopedic surgeon.  Patient believes that he did not receive the right treatment and wanted to follow-up in the orthopedic office today.  However he was unable to secure an appointment leading to his presentation to the ED today.  Stated that he has not started the antibiotics he was prescribed at [facility].  His pain is also progressively worsened.  Denies any other injury. Denies any other orthopedic complaints at this time.      Past Medical History:    No past medical history on file.  Past Surgical History:    No past surgical history on file.  Current Medications:   No current facility-administered medications for this encounter.  Allergies:  Patient has no known allergies.    Social History:   TOBACCO:   has no history on file for tobacco use.  ETOH:   has no history on file for alcohol use.  DRUGS:   has no history on file for drug use.  ACTIVITIES OF DAILY LIVING:    OCCUPATION:    Family History:   No family history on file.    REVIEW OF SYSTEMS:  CONSTITUTIONAL:  negative for  fevers, chills  EYES:  negative for blurred vision, visual disturbance  HEENT:  negative for  hearing loss, voice change  RESPIRATORY:  negative for  dyspnea, wheezing  CARDIOVASCULAR:  negative for  chest pain, palpitations  GASTROINTESTINAL:  negative for nausea, vomiting  GENITOURINARY:  negative for frequency, urinary incontinence  HEMATOLOGIC/LYMPHATIC:  negative for bleeding and petechiae  MUSCULOSKELETAL: See HPI  NEUROLOGICAL:  negative for headaches, dizziness  BEHAVIOR/PSYCH:  negative for increased agitation and

## 2024-05-15 NOTE — ED PROVIDER NOTES
widening of the scapholunate interval.             ED Course / Medical Decision Making     Medications   oxyCODONE-acetaminophen (PERCOCET) 5-325 MG per tablet 1 tablet (1 tablet Oral Given 5/15/24 1215)   cephALEXin (KEFLEX) capsule 500 mg (500 mg Oral Given 5/15/24 1215)   lidocaine 1 % injection 20 mL (20 mLs IntraDERmal Given 5/15/24 1617)     ED Course as of 05/15/24 1736   Wed May 15, 2024   1207   ATTENDING PROVIDER ATTESTATION:     I have personally performed and/or participated in the history, exam, medical decision making, and procedures and agree with all pertinent clinical information unless otherwise noted.    I have also reviewed and agree with the past medical, family and social history unless otherwise noted.    I have discussed this patient in detail with the resident and provided the instruction and education regarding the evidence-based evaluation and treatment of finger injury.    Any EKG that may have been performed has been personally reviewed by me and I agree with the documentation as noted by the resident.    History: Patient presents to the ED for evaluation of an injury to his left fifth finger.  Patient states that yesterday he got it caught in a wood .  Was seenin an ED by [facility] and had it evaluated.  They closed it.  He is scheduled to see orthopedics as they told him he may need to have this finger removed.  He was started on antibiotics which she has not filled yet.  Came in today because he just wants it to be addressed sooner.    My findings: Yasir Tobar is a 53 y.o. male whom is in no distress. Physical exam reveals patient to have a crush injury of the left fifth digit.  It is sutured.  No surrounding erythema.  No apparent discharge.  No bleeding present.  Diminished sensation noted.  Cap refill is appropriate    My plan: Symptomatic and supportive care.  Orthopedic consult    Electronically signed by Indio Guardado DO on 5/15/24 at 12:07 PM EDT      [MS]   1634 Patient

## 2024-05-15 NOTE — ED NOTES
Spoke with pt son, Mike, on phone and son states that he is on his way to hospital but will probably be over an hour before getting here. Pt updated.

## 2024-05-16 DIAGNOSIS — I50.30 HEART FAILURE WITH PRESERVED LEFT VENTRICULAR FUNCTION (MULTI): ICD-10-CM

## 2024-05-16 RX ORDER — TORSEMIDE 20 MG/1
40 TABLET ORAL DAILY
Qty: 60 TABLET | Refills: 4 | Status: SHIPPED | OUTPATIENT
Start: 2024-05-16 | End: 2024-10-13

## 2024-08-27 DIAGNOSIS — I50.32 CHRONIC HEART FAILURE WITH PRESERVED EJECTION FRACTION (MULTI): Primary | ICD-10-CM

## 2024-08-27 RX ORDER — POTASSIUM CHLORIDE 750 MG/1
10 CAPSULE, EXTENDED RELEASE ORAL EVERY MORNING
Qty: 90 CAPSULE | Refills: 0 | Status: SHIPPED | OUTPATIENT
Start: 2024-08-27

## 2024-09-10 ENCOUNTER — APPOINTMENT (OUTPATIENT)
Dept: PULMONOLOGY | Facility: CLINIC | Age: 54
End: 2024-09-10
Payer: COMMERCIAL

## 2024-09-12 ENCOUNTER — TELEPHONE (OUTPATIENT)
Dept: PULMONOLOGY | Facility: CLINIC | Age: 54
End: 2024-09-12
Payer: COMMERCIAL

## 2024-09-16 DIAGNOSIS — I25.10 CORONARY ARTERY DISEASE INVOLVING NATIVE CORONARY ARTERY OF NATIVE HEART WITHOUT ANGINA PECTORIS: ICD-10-CM

## 2024-09-16 DIAGNOSIS — I10 ESSENTIAL HYPERTENSION: ICD-10-CM

## 2024-09-16 DIAGNOSIS — I50.32 CHRONIC HEART FAILURE WITH PRESERVED EJECTION FRACTION: ICD-10-CM

## 2024-09-16 DIAGNOSIS — J44.9 CHRONIC OBSTRUCTIVE PULMONARY DISEASE, UNSPECIFIED COPD TYPE (MULTI): ICD-10-CM

## 2024-09-16 DIAGNOSIS — E78.2 MIXED HYPERLIPIDEMIA: ICD-10-CM

## 2024-09-16 DIAGNOSIS — I50.30 HEART FAILURE WITH PRESERVED LEFT VENTRICULAR FUNCTION: ICD-10-CM

## 2024-09-16 RX ORDER — TORSEMIDE 20 MG/1
40 TABLET ORAL DAILY
Qty: 60 TABLET | Refills: 1 | Status: SHIPPED | OUTPATIENT
Start: 2024-09-16 | End: 2024-11-15

## 2024-09-16 RX ORDER — DILTIAZEM HYDROCHLORIDE 180 MG/1
180 CAPSULE, COATED, EXTENDED RELEASE ORAL DAILY
Qty: 30 CAPSULE | Refills: 1 | Status: SHIPPED | OUTPATIENT
Start: 2024-09-16 | End: 2024-11-15

## 2024-11-07 DIAGNOSIS — I50.30 HEART FAILURE WITH PRESERVED LEFT VENTRICULAR FUNCTION: ICD-10-CM

## 2024-11-07 RX ORDER — TORSEMIDE 20 MG/1
40 TABLET ORAL DAILY
Qty: 60 TABLET | Refills: 0 | Status: SHIPPED | OUTPATIENT
Start: 2024-11-07 | End: 2024-12-07

## 2024-11-15 ENCOUNTER — APPOINTMENT (OUTPATIENT)
Dept: SLEEP MEDICINE | Facility: CLINIC | Age: 54
End: 2024-11-15
Payer: COMMERCIAL

## 2024-11-15 VITALS
HEART RATE: 55 BPM | OXYGEN SATURATION: 93 % | SYSTOLIC BLOOD PRESSURE: 140 MMHG | BODY MASS INDEX: 44.86 KG/M2 | WEIGHT: 315 LBS | DIASTOLIC BLOOD PRESSURE: 65 MMHG

## 2024-11-15 DIAGNOSIS — G25.81 RLS (RESTLESS LEGS SYNDROME): ICD-10-CM

## 2024-11-15 DIAGNOSIS — G47.33 OSA (OBSTRUCTIVE SLEEP APNEA): Primary | ICD-10-CM

## 2024-11-15 PROCEDURE — 99203 OFFICE O/P NEW LOW 30 MIN: CPT | Performed by: PSYCHIATRY & NEUROLOGY

## 2024-11-15 RX ORDER — GABAPENTIN 600 MG/1
1200 TABLET ORAL NIGHTLY
COMMUNITY
Start: 2024-09-25 | End: 2024-12-24

## 2024-11-15 RX ORDER — MIRTAZAPINE 7.5 MG/1
7.5 TABLET, FILM COATED ORAL NIGHTLY
COMMUNITY
Start: 2024-10-15 | End: 2025-01-13

## 2024-11-15 RX ORDER — BUPROPION HYDROCHLORIDE 300 MG/1
300 TABLET ORAL DAILY
COMMUNITY
Start: 2024-09-03

## 2024-11-15 RX ORDER — TADALAFIL 10 MG/1
10 TABLET ORAL DAILY PRN
COMMUNITY

## 2024-11-15 ASSESSMENT — PATIENT HEALTH QUESTIONNAIRE - PHQ9
SUM OF ALL RESPONSES TO PHQ9 QUESTIONS 1 AND 2: 1
1. LITTLE INTEREST OR PLEASURE IN DOING THINGS: NOT AT ALL
10. IF YOU CHECKED OFF ANY PROBLEMS, HOW DIFFICULT HAVE THESE PROBLEMS MADE IT FOR YOU TO DO YOUR WORK, TAKE CARE OF THINGS AT HOME, OR GET ALONG WITH OTHER PEOPLE: NOT DIFFICULT AT ALL
2. FEELING DOWN, DEPRESSED OR HOPELESS: SEVERAL DAYS

## 2024-11-15 NOTE — PROGRESS NOTES
" Patient: Corey Jj    [id number]  : 1970 -- AGE 54 y.o.    Provider: Simón Lafleur MD     [facility]   Service Date: 11/15/2024              [facility]  Follow-up Note        HPI: Corey Jj is a 54 y.o. male with MARV on CPAP with COPD, ILD and CHF. He is here today for a follow up visit. He is new to me, but was last seen on 24 by my colleague, JOSEPH Harvey-CNP.    He is accompanied today by his wife.    Using CPAP nightly. Got his new Abbie device (replaced due to the recall) in the last 6 months. Cannot tolerate using it because it is not set to the correct pressure settings. He brought in today both his old (current) device and his new one. The new one has only 2.5 hours of usage on it. He is using the original machine, but feels pressures are weak now.    He has been tired in the last few months, associates this with CPAP pressures not holding.    PAP DEVICE   Setup date: 6/15/2018  Type: Abbie DreamStation autoCPAP  Finding benefit: yes - \"cannot sleep without it\"    MASK  Type: nasal  Fit: good  Patient is keeping the equipment clean. Needs news supplies.    Prior Sleep studies:   -HST 2018: results not available for review  -CPAP titration 10/2018: wt 280 lbs. \"adequate therapeutic study\". Recommended CPAP 19 cm H2O. PLM index 16/h. Supine REM not recorded. F&P Simplus FFM size medium            NIGHTTIME SYMPTOMS:   Ongoing Snoring: mild  Nocturnal Gasping: no  Nocturnal Choking: no  Pressure intolerance: no  Air hunger: no    RLS: was an issue prior to starting gabapentin 1200 mg at bedtime. Much better controlled now      REVIEW OF MACHINE DOWNLOAD:   Date Range: last 30 days  % Days used: 100%  % Days with Usage >= 4 hrs: 100%  Average usage days used: 9.7 h  Settin cmH2O, ramp for 45 min starting at 5.5 cm H2O, but pt does not use ramp function  cmH2O  Mask fit: 100%  Residual AHI: 4.3  Therapy hours: 19,128    Patient " Active Problem List   Diagnosis    Abnormal findings on cardiac catheterization    Atherosclerosis of native coronary artery    CHF (congestive heart failure)    COPD (chronic obstructive pulmonary disease) (Multi)    Exertional dyspnea    Heart failure with preserved left ventricular function    History of heart artery stent    Acute on chronic congestive heart failure, unspecified heart failure type    ILD (interstitial lung disease) (Multi)    MARV (obstructive sleep apnea)     Past Medical History:   Diagnosis Date    COPD (chronic obstructive pulmonary disease) (Multi)     Diabetes (Multi)     MI (myocardial infarction) (Multi)      No past surgical history on file.  Current Outpatient Medications on File Prior to Visit   Medication Sig Dispense Refill    albuterol 108 (90 Base) MCG/ACT inhaler Inhale 2 puffs every 4 hours if needed.      aspirin 81 mg EC tablet Take 1 tablet (81 mg) by mouth once daily. sometimes      atorvastatin (Lipitor) 80 mg tablet Take 1 tablet (80 mg) by mouth once daily in the morning.      buPROPion (Wellbutrin) 100 mg tablet Take 1 tablet (100 mg) by mouth 2 times a day.      clopidogrel (Plavix) 75 mg tablet Take 1 tablet (75 mg) by mouth once daily in the morning.      dilTIAZem CD (Cardizem CD) 180 mg 24 hr capsule Take 1 capsule (180 mg) by mouth once daily. 30 capsule 1    fluticasone-umeclidin-vilanter (Trelegy Ellipta) 100-62.5-25 mcg blister with device Inhale 1 puff once daily with breakfast. One inhalation a day, rinse mouth after use 60 each 11    nortriptyline (Pamelor) 75 mg capsule Take 2 capsules (150 mg) by mouth once daily at bedtime.      potassium chloride ER (Micro-K) 10 mEq ER capsule Take 1 capsule (10 mEq) by mouth once daily in the morning. Do not crush or chew. Take when taking lasix 90 capsule 0    sertraline (Zoloft) 100 mg tablet Take 1 tablet (100 mg) by mouth 2 times a day.      torsemide (Demadex) 20 mg tablet Take 2 tablets (40 mg) by mouth once daily.  60 tablet 0    albuterol 90 mcg/actuation inhaler Inhale 2 puffs every 6 hours if needed for wheezing or shortness of breath. Use as needed, 1 to 2 puffs every 4 to 6 hours, no more than 4 times a day 18 g 11     No current facility-administered medications on file prior to visit.       PHYSICAL EXAMINATION:   Vitals:    11/15/24 0952   BP: 140/65   BP Location: Left arm   Patient Position: Sitting   BP Cuff Size: Adult   Pulse: 55   SpO2: 93%   Weight: (!) 158 kg (349 lb 6.4 oz)     Body mass index is 44.86 kg/m².  General: Awake. Alert. Comfortable. No apparent distress.   Speech: Normal.  Comprehension: Normal.  Mood: Stable.  Affect: Appropriate.  Pul:         Normal respiratory effort.   Abd:         Obese  Neuro: Alert, well-oriented. Cranial nerves II-XII grossly normal and symmetric.  Moves all limbs symmetrically with no evidence of significant focal weakness. No abnormal movements noted.       ASSESSMENT AND PLAN: Mr. Corey Jj is a 54 y.o. male with RLS and MARV on CPAP, using his original machine from 2018, but feels the pressure is no longer holding and the device is affected by the recall. He has a new machine from [employer], but it was not programmed to his needed settings, so he has not been using it. Today I programmed his new machine to be autoCPAP 19-20 cm H2O. RLS much better controlled with gabapentin at bedtime.    #MARV  -pt's original CPAP device is broken beyond repair - pressures no longer holding, affected by recall, and has over 19,000 hours of use.  -I set pt's new CPAP (Abbie DreamStation) to 19-20 cm H2O, ramp off per pt request  -Rx to DME [facility]) for all new supplies    #RLS - better controlled with gabapentin 1200 mg at bedtime  -continue gabapentin 1200 mg at bedtime    All of the above was discussed with the patient in detail. He voiced an understanding of the above and was agreeable to proceed further as advised.     35 minutes were spent with the patient plus time spent  reviewing the chart, updating the chart as needed, and documenting.     FOLLOW UP: 2 months

## 2024-11-17 PROBLEM — G25.81 RLS (RESTLESS LEGS SYNDROME): Status: ACTIVE | Noted: 2024-11-17

## 2024-11-26 DIAGNOSIS — I50.32 CHRONIC HEART FAILURE WITH PRESERVED EJECTION FRACTION: ICD-10-CM

## 2024-11-27 DIAGNOSIS — I50.32 CHRONIC HEART FAILURE WITH PRESERVED EJECTION FRACTION: ICD-10-CM

## 2024-11-27 DIAGNOSIS — I25.10 CORONARY ARTERY DISEASE INVOLVING NATIVE CORONARY ARTERY OF NATIVE HEART WITHOUT ANGINA PECTORIS: ICD-10-CM

## 2024-11-27 DIAGNOSIS — E78.2 MIXED HYPERLIPIDEMIA: ICD-10-CM

## 2024-11-27 DIAGNOSIS — I10 ESSENTIAL HYPERTENSION: ICD-10-CM

## 2024-11-27 DIAGNOSIS — J44.9 CHRONIC OBSTRUCTIVE PULMONARY DISEASE, UNSPECIFIED COPD TYPE (MULTI): ICD-10-CM

## 2024-11-27 RX ORDER — DILTIAZEM HYDROCHLORIDE 180 MG/1
180 CAPSULE, COATED, EXTENDED RELEASE ORAL DAILY
Qty: 30 CAPSULE | Refills: 1 | Status: SHIPPED | OUTPATIENT
Start: 2024-11-27 | End: 2025-01-26

## 2024-11-29 DIAGNOSIS — E78.2 MIXED HYPERLIPIDEMIA: ICD-10-CM

## 2024-11-29 DIAGNOSIS — I50.30 HEART FAILURE WITH PRESERVED LEFT VENTRICULAR FUNCTION: ICD-10-CM

## 2024-11-29 DIAGNOSIS — I10 ESSENTIAL HYPERTENSION: ICD-10-CM

## 2024-11-29 DIAGNOSIS — I25.10 CORONARY ARTERY DISEASE INVOLVING NATIVE CORONARY ARTERY OF NATIVE HEART WITHOUT ANGINA PECTORIS: ICD-10-CM

## 2024-11-29 DIAGNOSIS — I50.32 CHRONIC HEART FAILURE WITH PRESERVED EJECTION FRACTION: ICD-10-CM

## 2024-11-29 DIAGNOSIS — J44.9 CHRONIC OBSTRUCTIVE PULMONARY DISEASE, UNSPECIFIED COPD TYPE (MULTI): ICD-10-CM

## 2024-12-02 RX ORDER — DILTIAZEM HYDROCHLORIDE 180 MG/1
180 CAPSULE, COATED, EXTENDED RELEASE ORAL DAILY
Qty: 20 CAPSULE | Refills: 0 | Status: SHIPPED | OUTPATIENT
Start: 2024-12-02 | End: 2024-12-22

## 2024-12-02 RX ORDER — POTASSIUM CHLORIDE 750 MG/1
10 CAPSULE, EXTENDED RELEASE ORAL EVERY MORNING
Qty: 20 CAPSULE | Refills: 0 | Status: SHIPPED | OUTPATIENT
Start: 2024-12-02

## 2024-12-02 RX ORDER — ATORVASTATIN CALCIUM 80 MG/1
80 TABLET, FILM COATED ORAL EVERY MORNING
Qty: 20 TABLET | Refills: 0 | Status: SHIPPED | OUTPATIENT
Start: 2024-12-02 | End: 2024-12-22

## 2024-12-17 ENCOUNTER — OFFICE VISIT (OUTPATIENT)
Dept: CARDIOLOGY | Facility: HOSPITAL | Age: 54
End: 2024-12-17
Payer: COMMERCIAL

## 2024-12-17 VITALS
SYSTOLIC BLOOD PRESSURE: 154 MMHG | WEIGHT: 315 LBS | OXYGEN SATURATION: 96 % | HEART RATE: 79 BPM | DIASTOLIC BLOOD PRESSURE: 79 MMHG | BODY MASS INDEX: 43.45 KG/M2

## 2024-12-17 DIAGNOSIS — I25.10 ATHEROSCLEROSIS OF NATIVE CORONARY ARTERY OF NATIVE HEART WITHOUT ANGINA PECTORIS: ICD-10-CM

## 2024-12-17 DIAGNOSIS — I50.30 HEART FAILURE WITH PRESERVED LEFT VENTRICULAR FUNCTION: ICD-10-CM

## 2024-12-17 DIAGNOSIS — Z95.5 HISTORY OF HEART ARTERY STENT: Primary | ICD-10-CM

## 2024-12-17 LAB
ATRIAL RATE: 73 BPM
P AXIS: 46 DEGREES
P OFFSET: 205 MS
P ONSET: 137 MS
PR INTERVAL: 160 MS
Q ONSET: 217 MS
QRS COUNT: 12 BEATS
QRS DURATION: 90 MS
QT INTERVAL: 434 MS
QTC CALCULATION(BAZETT): 478 MS
QTC FREDERICIA: 463 MS
R AXIS: 27 DEGREES
T AXIS: 48 DEGREES
T OFFSET: 434 MS
VENTRICULAR RATE: 73 BPM

## 2024-12-17 PROCEDURE — 93005 ELECTROCARDIOGRAM TRACING: CPT | Performed by: STUDENT IN AN ORGANIZED HEALTH CARE EDUCATION/TRAINING PROGRAM

## 2024-12-17 PROCEDURE — 99215 OFFICE O/P EST HI 40 MIN: CPT | Performed by: STUDENT IN AN ORGANIZED HEALTH CARE EDUCATION/TRAINING PROGRAM

## 2024-12-17 PROCEDURE — 93010 ELECTROCARDIOGRAM REPORT: CPT | Performed by: STUDENT IN AN ORGANIZED HEALTH CARE EDUCATION/TRAINING PROGRAM

## 2024-12-17 RX ORDER — SPIRONOLACTONE 25 MG/1
25 TABLET ORAL DAILY
Qty: 30 TABLET | Refills: 11 | Status: SHIPPED | OUTPATIENT
Start: 2024-12-17 | End: 2025-12-17

## 2024-12-17 NOTE — PROGRESS NOTES
Primary Care Physician: No Assigned PCP Generic Provider, MD   Date of Visit: 12/17/2024  1:40 PM EST  Type of Visit: follow up       Chief Complaint:  No chief complaint on file.       HPI  Corey Jj 54 y.o. male with a medical history of MARV on CPAP, HfpEF, hypertension, depression and COPD and CAD with LHC with MARIANA x1 to ostial RCA 4/2023 in the setting of NSTEMI, TTE with normal EF, asymmetrical septal hypertrophy. Did not tolerate metoprolol due to Erectile dysfunction      Here today for follow up     He does not measure his BP   He has damian with moderate exertion, occasional wheezing. Not using inhalers     No bleeding issues  No chest pain  No dizziness or syncope    His pulmonary CNP left practice   Does not have PCP      Review of Systems   Review of Systems   12 points review of systems are negative expect for the above    Social History:  Social History     Socioeconomic History    Marital status:      Spouse name: Not on file    Number of children: Not on file    Years of education: Not on file    Highest education level: Not on file   Occupational History    Not on file   Tobacco Use    Smoking status: Former     Types: Cigarettes    Smokeless tobacco: Current     Types: Chew   Vaping Use    Vaping status: Never Used   Substance and Sexual Activity    Alcohol use: Never    Drug use: Never    Sexual activity: Not on file   Other Topics Concern    Not on file   Social History Narrative    Not on file     Social Drivers of Health     Financial Resource Strain: Low Risk  (11/1/2023)    Overall Financial Resource Strain (CARDIA)     Difficulty of Paying Living Expenses: Not very hard   Food Insecurity: Not on file   Transportation Needs: No Transportation Needs (11/1/2023)    PRAPARE - Transportation     Lack of Transportation (Medical): No     Lack of Transportation (Non-Medical): No   Physical Activity: Not on file   Stress: Not on file   Social Connections: Not on file   Intimate Partner  "Violence: Not on file   Housing Stability: Low Risk  (11/1/2023)    Housing Stability Vital Sign     Unable to Pay for Housing in the Last Year: No     Number of Places Lived in the Last Year: 1     Unstable Housing in the Last Year: No        Past Medical History:  Past Medical History:   Diagnosis Date    COPD (chronic obstructive pulmonary disease) (Multi)     Diabetes (Multi)     MI (myocardial infarction) (Multi)        Past Surgical History:  No past surgical history on file.    Family History:  No family history on file.     Objective:       11/1/2023     9:00 AM 1/16/2024    10:10 AM 3/6/2024     1:40 PM 3/6/2024     4:04 PM 4/9/2024     9:30 AM 11/15/2024     9:52 AM 12/17/2024     1:35 PM   Vitals   Systolic 114 117 159 137 140 140 154   Diastolic 70 67 92 62 75 65 79   BP Location Left arm     Left arm    Heart Rate 83 92 92 90 92 55 79   Temp 36.8 °C (98.2 °F)  37.1 °C (98.7 °F)       Resp 20  20 18      Height   1.88 m (6' 2\")       Weight (lb)  331.6 320  329.9 349.4 338.41   BMI  42.57 kg/m2 41.09 kg/m2  42.36 kg/m2 44.86 kg/m2 43.45 kg/m2   BSA (m2)  2.8 m2 2.75 m2  2.8 m2 2.87 m2 2.84 m2   Visit Report  Report   Report Report Report      Constitutional:       Appearance: Healthy appearance. Not in distress. Obese   Neck:      Vascular: No JVR. JVD normal.   Pulmonary:      Effort: Pulmonary effort is normal.      Breath sounds: Normal breath sounds. No wheezing. No rhonchi. No rales.   Chest:      Chest wall: Not tender to palpatation.   Cardiovascular:      Normal rate. Regular rhythm. Normal S1. Normal S2.       Murmurs: There is no murmur.      No gallop.  N No rub.   Pulses:     Intact distal pulses.   Edema:     Peripheral edema 1+.   Abdominal:      General: Bowel sounds are normal.      Palpations: Abdomen is soft.      Tenderness: There is no abdominal tenderness.   Musculoskeletal: Normal range of motion.         General: No tenderness.   Skin:     General: Skin is warm and dry. "   Neurological:      General: No focal deficit present.      Mental Status: Alert and oriented to person, place and time.     Allergies:  No Known Allergies    Medications:  Current Outpatient Medications   Medication Instructions    albuterol 108 (90 Base) MCG/ACT inhaler 2 puffs, Every 4 hours PRN    albuterol 90 mcg/actuation inhaler 2 puffs, inhalation, Every 6 hours PRN, Use as needed, 1 to 2 puffs every 4 to 6 hours, no more than 4 times a day    aspirin 81 mg, Daily    atorvastatin (LIPITOR) 80 mg, oral, Every morning    buPROPion XL (WELLBUTRIN XL) 300 mg, Daily    clopidogrel (PLAVIX) 75 mg, Every morning    dilTIAZem CD (CARDIZEM CD) 180 mg, oral, Daily    fluticasone-umeclidin-vilanter (Trelegy Ellipta) 100-62.5-25 mcg blister with device 1 puff, inhalation, Daily with breakfast, One inhalation a day, rinse mouth after use    gabapentin (NEURONTIN) 1,200 mg, Nightly    mirtazapine (REMERON) 7.5 mg, Nightly    nortriptyline (Pamelor) 75 mg capsule 2 capsules, Nightly    potassium chloride ER (Micro-K) 10 mEq ER capsule 10 mEq, oral, Every morning, Do not crush or chew. Take when taking lasix    sertraline (ZOLOFT) 100 mg, 2 times daily    tadalafil (CIALIS) 10 mg, Daily PRN    torsemide (DEMADEX) 40 mg, oral, Daily        Labs and Imaging:     Lab Results   Component Value Date    WBC 7.0 11/01/2023    HGB 10.6 (L) 11/01/2023    HCT 35.0 (L) 11/01/2023     11/01/2023    CHOL 201 (H) 03/29/2023    TRIG 197 (H) 03/29/2023    HDL 31.2 (A) 03/29/2023    ALT 31 10/30/2023    AST 20 10/30/2023     11/01/2023    K 3.7 11/01/2023     11/01/2023    CREATININE 1.33 (H) 11/01/2023    BUN 22 11/01/2023    CO2 31 11/01/2023    INR 1.1 10/30/2023         Echocardiogram:   Echocardiogram     [facility], [address] [address]  Tel [phone] and Fax [phone]    TRANSTHORACIC ECHOCARDIOGRAM REPORT      Patient Name:     AMBROCIO Castillo  Physician:  [id number] Ivonne Gaona MD  Study Date:       3/30/2023           Referring           Anant Burgess DO  Physician:  MRN/PID:          [id number]            PCP:  Accession/Order#: [id number]           [address]  Location:  YOB: 1970            Fellow:  Gender:           M                   Nurse:              Caroline Butts RN  Admit Date:       3/28/2023           Sonographer:        Celeste Hernandez [facility]  Admission Status: Inpatient - Routine Additional Staff:   Kylee Pereyra [facility]  Height:           177.80 cm           CC Report to:  Weight:           140.16 kg           Study Type:         Echocardiogram  BSA:              2.51 m2  Blood Pressure: 142 /76 mmHg    Diagnosis/ICD: I50.22-Chronic systolic (congestive) heart failure (CHF)  Indication:    Congestive Heart Failure  Procedure/CPT: Echo Complete w Full [id number]    Patient History:  Pertinent History: Dyspnea, CHF, HTN and Depression.    Study Detail: The following Echo studies were performed: 2D, M-Mode, Doppler and  color flow. Image quality for this study is poor. Technically  challenging study due to poor acoustic windows, prominent lung  artifact, body habitus and PT. coughing throughout study. Definity  used as a contrast agent for endocardial border definition. Total  contrast used for this procedure was 2 mL via IV push. The patient  was awake.      PHYSICIAN INTERPRETATION:  Left Ventricle: The left ventricular systolic function is normal, with an estimated ejection fraction of 55-60%. The calculated ejection fraction is normal at 58 % using the Barry's Bi-plane MOD calculation. There are no regional wall motion abnormalities. The left ventricular cavity size is normal. There is asymmetric left ventricular hypertrophy. Spectral Doppler shows an impaired relaxation pattern of left ventricular diastolic filling. There is an elevated mean left atrial pressure.  Left Atrium: The left atrium is  normal in size.  Right Ventricle: The right ventricle is normal in size. There is normal right ventricular global systolic function.  Right Atrium: The right atrium is normal in size.  Aortic Valve: The aortic valve is probably trileaflet. There is minimal aortic valve cusp calcification. There is no evidence of aortic valve stenosis.  There is trace to mild aortic valve regurgitation. The peak instantaneous gradient of the aortic valve is 11.2 mmHg. The mean gradient of the aortic valve is 5.0 mmHg.  Mitral Valve: The mitral valve is normal in structure. There is mild mitral valve regurgitation.  Tricuspid Valve: The tricuspid valve is structurally normal. There is trace to mild tricuspid regurgitation. The right ventricular systolic pressure is unable to be estimated.  Pulmonic Valve: The pulmonic valve is not well visualized. There is physiologic pulmonic valve regurgitation.  Pericardium: There is no pericardial effusion noted.  Aorta: The aortic root is normal.  Systemic Veins: The inferior vena cava appears to be of normal size.  In comparison to the previous echocardiogram(s): There are no prior studies on this patient for comparison purposes.      CONCLUSIONS:  1. Left ventricular systolic function is normal with a 55-60% estimated ejection fraction.  2. Spectral Doppler shows an impaired relaxation pattern of left ventricular diastolic filling.  3. There is an elevated mean left atrial pressure.  4. There is asymmetric left ventricular hypertrophy.  5. Technically diffcult study.    QUANTITATIVE DATA SUMMARY:  2D MEASUREMENTS:  Normal Ranges:  IVSd:          1.85 cm    (0.6-1.1cm)  LVPWd:         0.91 cm    (0.6-1.1cm)  LVIDd:         6.84 cm    (3.9-5.9cm)  LVIDs:         3.04 cm  LV Mass Index: 187.2 g/m2  LV % FS        55.6 %    LA VOLUME:  Normal Ranges:  LA Vol A4C:        43.1 ml    (22+/-6mL/m2)  LA Vol A2C:        48.5 ml  LA Vol BP:         48.1 ml  LA Vol Index A4C:  17.2ml/m2  LA Vol Index A2C:   19.3 ml/m2  LA Vol Index BP:   19.1 ml/m2  LA Area A4C:       15.6 cm2  LA Area A2C:       17.4 cm2  LA Major Axis A4C: 4.8 cm  LA Major Axis A2C: 5.3 cm  LA Volume Index:   27.9 ml/m2  LA Vol A4C:        39.0 ml  LA Vol A2C:        51.5 ml    AORTA MEASUREMENTS:  Normal Ranges:  Asc Ao, d: 3.80 cm (2.1-3.4cm)    LV SYSTOLIC FUNCTION BY 2D PLANIMETRY (MOD):  Normal Ranges:  EF-A4C View: 58.1 % (>=55%)  EF-A2C View: 64.2 %  EF-Biplane:  58.3 %    LV DIASTOLIC FUNCTION:  Normal Ranges:  MV Peak E:    1.10 m/s (0.7-1.2 m/s)  MV Peak A:    1.39 m/s (0.42-0.7 m/s)  E/A Ratio:    0.79     (1.0-2.2)  MV e'         0.06 m/s (>8.0)  MV lateral e' 0.06 m/s  MV medial e'  0.06 m/s  E/e' Ratio:   18.33    (<8.0)    MITRAL VALVE:  Normal Ranges:  MV DT: 201 msec (150-240msec)    AORTIC VALVE:  Normal Ranges:  AoV Vmax:                1.67 m/s  (<=1.7m/s)  AoV Peak P.2 mmHg (<20mmHg)  AoV Mean P.0 mmHg  (1.7-11.5mmHg)  LVOT Max Patrick:            1.20 m/s  (<=1.1m/s)  AoV VTI:                 22.50 cm  (18-25cm)  LVOT VTI:                22.10 cm  LVOT Diameter:           2.80 cm   (1.8-2.4cm)  AoV Area, VTI:           6.05 cm2  (2.5-5.5cm2)  AoV Area,Vmax:           4.42 cm2  (2.5-4.5cm2)  AoV Dimensionless Index: 0.98    RIGHT VENTRICLE:  RV 1   4.0 cm  RV 2   2.78 cm  RV 3   7.05 cm  TAPSE: 25.7 mm  RV s'  0.11 m/s    TRICUSPID VALVE/RVSP:  Normal Ranges:  Peak TR Velocity: 0.79 m/s  RV Syst Pressure: 5.5 mmHg (< 30mmHg)  IVC Diam:         1.49 cm    PULMONIC VALVE:  Normal Ranges:  PV Max Patrick: 1.2 m/s  (0.6-0.9m/s)  PV Max P.2 mmHg      [id number] Ivonne Gaona MD  Electronically signed on 3/30/2023 at 11:33:52 AM         Final     Stress Testing: No results found for this or any previous visit from the past 1825 days.    Cardiac Catheterization:   Adult Cath     [facility], Cath Lab, [address] [place] [address]    Cardiovascular Catheterization  Report    Patient Name:     AMBROCIO CORDOBA Performing Physician:  [id number] Lev Moreno MD  Study Date:       3/31/2023   Verifying Physician:   [id number] Lev Moreno MD  MRN/PID:          [id number]    Cardiologist/Co-scrub:  Accession/Order#: [id number]   Fellow:  YOB: 1970    Fellow:  Gender:           M           Referring Physician:   MORGAN ESPINOZA  Admit Date:                   Referring Physician:   -  Surgeon:                      Referring Physician:   -      Study: Left and Right Heart Catheterization      Indications:  AMBROCIO CORDOBA is a 53 year old male who presents with hypertension. Acute coronary syndrome > 24 hrs, with a chest pain assessment of typical angina. Study performed as an urgent cath procedure. Heart failure.    Medical History:  Stress test performed: No. CTA performed: No. Agatston accessed: No. LVEF Assessed: Yes. LVEF = 60%.    Procedure Description:  A balloon tipped catheter was advanced through the right heart to record pressures. Cardiac output was calculated via the Be method.    Coronary Angiography:  The coronary circulation is right dominant.    Left Main Coronary Artery:  The left main coronary artery is a normal caliber vessel. The left main arises normally from the left coronary sinus of Valsalva and bifurcates into the LAD and circumflex coronary arteries. The left main coronary artery showed no significant disease or stenosis greater than 30%. IVUS of the LM was performed and demonstrated no significant obstructive disease; angiographic appearance of disease likely secondary to vessel curvature.    Left Anterior Descending Coronary Artery Distribution:  The left anterior descending coronary artery is a normal caliber vessel. The LAD arises normally from the left main coronary artery. The LAD demonstrated no significant disease or stenosis greater than 30%.    Circumflex Coronary Artery Distribution:  The circumflex coronary artery is a normal caliber vessel. The  circumflex arises normally from the left main coronary artery and terminates in the AV groove. The circumflex revealed no significant disease or stenosis greater than 30%.    Right Heart Catheterization:  A balloon tipped catheter was advanced through the right heart to record pressures. Cardiac output was calculated via the Be method. Elevated left sided filling pressures with normal cardiac output. Elevated ventricular filling pressure. Cardiac output is normal. Preserved cardiac output at rest. No pulmonary vascular resistance. Elevated systemic vascular resistance.    RA 7 mmHg, 63%  RV 40/7 mmHg  PA 40/20 mmHg (27 mmHg), 60%  PCWP 20 mmHg (considerable respiratory variation)  Ao (91 mmHg), 89%  LVEDP 20 mmHg  PVR 1.2 Smart  SVR 1222 cgs.    Right Coronary Artery Distribution:    The right coronary artery is a normal caliber vessel. The RCA arises normally from the right sinus of Valsalva. The RCA showed no significant disease or stenosis greater than 30%, a normal vessel and severe ostial obstruction. The ostial right coronary artery showed 90% stenosis.      Left Ventriculography:  LVEDP 20 mmHg.    Coronary Interventions:  Angiography reveals a 90% stenosis of the ostial right coronary artery. Pre-intervention YOKO flow was 3. Percutaneous coronary intervention was performed within the ostial right coronary artery. Vikram [place] 4.5 mm x 15 mm was advanced to the lesion and implanted. The stenosis was successfully reduced from 90% to <10%. Post intervention Intravascular Ultrasound (IVUS) was performed within the ostial right coronary artery . The stent demonstrated good apposition. No thrombus was visualized. No edge dissection is visualized. Post-intervention YOKO flow was 3.    Coronary Lesion Summary:  Vessel   Stenosis   Vessel Segment  RCA    90% stenosis     ostial      Hemo Personnel:  +---------------------+-------------+  Name                 Duty            +---------------------+-------------+  Lev Moreno MD         PROC MD 1  +---------------------+-------------+  Onofre Correa RRT        PROC SCRUB 1  +---------------------+-------------+  Marino Saxena RN      PROC CIRC 1  +---------------------+-------------+  Marino Saxena RN    PROC RECORD 1  +---------------------+-------------+  Niesha Goldsmith(R)PROC RECORD 2  +---------------------+-------------+      Sedation Time:  +------------------------+----------------------------------------+  Sedation Start/End TimesTime                                      +------------------------+----------------------------------------+  Start                   3/31/2023 10:34:14                        +------------------------+----------------------------------------+  Drugs                   Versed 1 mg IV per physician for sedatio  +------------------------+----------------------------------------+  End                     3/31/2023 12:24:32                        +------------------------+----------------------------------------+      Equipment Used:  +---------------------+--------------------------------------------------------+              Date/Time                                             Description  +---------------------+--------------------------------------------------------+  3/31/2023 10:32:21 AM     - [id number] 100ml bottle - Qty: 1 Each                                                                   Part #: [id number]  +---------------------+--------------------------------------------------------+  3/31/2023 10:32:41 AM  Terumo 6F Hermon 10cm Sheath - Qty:                                                          1 Each Part #: [id number]  +---------------------+--------------------------------------------------------+  3/31/2023 10:32:47 AM  Terumo 7F Hermon 10cm Sheath - Qty:                                                          1 Each Part #:  [id number]  +---------------------+--------------------------------------------------------+  3/31/2023 10:32:58 AM  - 5 Fr x 10cm S-FERNANDO Mini Access Kit -                                                        Qty: 1 Each Part #: [id number]  +---------------------+--------------------------------------------------------+  3/31/2023 12:32:18 PM    Abbott Versacore 0.035 mm x 145 cm                         Peripheral Guide wire floppy - Qty: 1 Each Part #: [id number]  +---------------------+--------------------------------------------------------+  3/31/2023 12:32:26 PM - InQwire 0.035mm x 150cm Guidewire,                                            3mm J tip - Qty: 1 Each Part #: 385  +---------------------+--------------------------------------------------------+  3/31/2023 12:32:49 PM  - 5 Fr x 10cm S-FERNANDO Mini Access Kit -                                                        Qty: 1 Each Part #: [id number]  +---------------------+--------------------------------------------------------+  3/31/2023 12:33:11 PM    - Marely Hi-Shore Femoral Vein                          Insertion Cath 7Fr x 110cm, - Qty: 1 Each Part #: [id number]  +---------------------+--------------------------------------------------------+  3/31/2023 12:33:21 PM     - 6 Fr PIG Impulse - Qty: 1 Each                                                                    Part #: 353  +---------------------+--------------------------------------------------------+  3/31/2023 12:33:30 PM     - 6 Fr FR4 Impulse - Qty: 1 Each                                                                    Part #: 337  +---------------------+--------------------------------------------------------+  3/31/2023 12:33:36 PM     - 6 Fr FL4 Impulse - Qty: 1 Each                                                                    Part #: 346  +---------------------+--------------------------------------------------------+  3/31/2023 12:33:45 PM   - 6 Fr  FL4.5 Impulse - Qty: 1 Each                                                                    Part #: 347  +---------------------+--------------------------------------------------------+  3/31/2023 12:33:58 PM - 6 Fr Expo Angio WRP Luciano Right                                Posterior curve 100cm - Qty: 1 Each Part #: 344  +---------------------+--------------------------------------------------------+  3/31/2023 12:34:09 PM          - RunThrough NS Coronary                            Guidewire 0.014mm x 180cm - Qty: 1 Each Part #: 489  +---------------------+--------------------------------------------------------+  3/31/2023 12:34:15 PM          - RunThrough NS Coronary                            Guidewire 0.014mm x 180cm - Qty: 1 Each Part #: 489  +---------------------+--------------------------------------------------------+  3/31/2023 12:34:33 PM     - 6 Fr 3DRIGHT Launcher Guide x 100cm -                                                         Qty: 1 Each Part #: 11  +---------------------+--------------------------------------------------------+  3/31/2023 12:34:47 PM   - 6 Fr JL4.5 curve Jimenez Guide x 100cm -                                                         Qty: 1 Each Part #: 45  +---------------------+--------------------------------------------------------+  3/31/2023 12:34:55 PM [place] [place] [facility] ST IVUS Catheter 150cm                                                 - Qty: 1 Each Part #: [id number]  +---------------------+--------------------------------------------------------+  3/31/2023 12:35:11 PM   Medtronic Vikram Gove RX MARIANA 4.5mm X 15mm -                                              Qty: 1 Each Part #: [id number]  +---------------------+--------------------------------------------------------+  3/31/2023 12:35:21 PM  - 6 Fr Angio-Seal VIP vascular closure                                                       - Qty: 1 Each  Part #: 79  +---------------------+--------------------------------------------------------+      Fluoroscopy Time:  +--------------------------+---------+  X-Ray Summary Fluoro Time:29.70 min  +--------------------------+---------+      +----------+---------+  Contrast: Dose:      +----------+---------+  Omnipaque:190.00 ml  +----------+---------+      Hemodynamic Pressures:    +----+----------+---------+-------------+-------------+---+----+-------+-------+  SiteDate Time   Phase  Systolic mmHg  Diastolic  ED MeanA-Wave V-Wave                   Name                    mmHg     mmHmmHg mmHg   mmHg                                                      g                     +----+----------+---------+-------------+-------------+---+----+-------+-------+    RA 3/31/2023 AIR REST                                5      8      5        11:07:20                                                                      AM                                                          +----+----------+---------+-------------+-------------+---+----+-------+-------+    RV 3/31/2023 AIR REST           36            1  6                          11:07:51                                                                      AM                                                          +----+----------+---------+-------------+-------------+---+----+-------+-------+    PW 3/31/2023 AIR REST                               14     17     18        11:09:02                                                                      AM                                                          +----+----------+---------+-------------+-------------+---+----+-------+-------+    PA 3/31/2023 AIR REST           31            9     21                      11:09:42                                                                       AM                                                          +----+----------+---------+-------------+-------------+---+----+-------+-------+    PA 3/31/2023 AIR REST           35           12     21                      11:12:19                                                                      AM                                                          +----+----------+---------+-------------+-------------+---+----+-------+-------+    LV 3/31/2023 AIR REST          113            3 16                          11:16:27                                                                      AM                                                          +----+----------+---------+-------------+-------------+---+----+-------+-------+    LV 3/31/2023 AIR REST          114            4 17                          11:17:03                                                                      AM                                                          +----+----------+---------+-------------+-------------+---+----+-------+-------+    LV 3/31/2023 [facility] REST          112            5 16                          11:18:24                                                                      AM                                                          +----+----------+---------+-------------+-------------+---+----+-------+-------+    LV 3/31/2023 [facility] REST          118            4 13                          11:18:38                                                                      AM                                                          +----+----------+---------+-------------+-------------+---+----+-------+-------+    LV 3/31/2023 [facility]          109            9 17                          11:20:14                                                                       AM                                                          +----+----------+---------+-------------+-------------+---+----+-------+-------+     3/31/2023 [facility] REST          115            4 14                          11:22:51                                                                      AM                                                          +----+----------+---------+-------------+-------------+---+----+-------+-------+     3/31/2023 [facility]          118            8 16                          11:23:16                                                                      AM                                                          +----+----------+---------+-------------+-------------+---+----+-------+-------+     3/31/2023 [facility]          113            3 11                          11:23:30                                                                      AM                                                          +----+----------+---------+-------------+-------------+---+----+-------+-------+     3/31/2023 [facility]          112            4 15                          11:23:37                                                                      AM                                                          +----+----------+---------+-------------+-------------+---+----+-------+-------+     3/31/2023 [facility] REST          109            6 13                          11:23:44                                                                      AM                                                          +----+----------+---------+-------------+-------------+---+----+-------+-------+    LV 3/31/2023 [facility]          108            5 19                          11:23:50                                                                       AM                                                          +----+----------+---------+-------------+-------------+---+----+-------+-------+   LVp 3/31/2023 [facility] REST          117            2 13                          11:24:39                                                                      AM                                                          +----+----------+---------+-------------+-------------+---+----+-------+-------+   AOp 3/31/2023 [facility]          123           68     91                      11:24:46                                                                      AM                                                          +----+----------+---------+-------------+-------------+---+----+-------+-------+    AO 3/31/2023 [facility]          115           68     89                      11:27:34                                                                      AM                                                          +----+----------+---------+-------------+-------------+---+----+-------+-------+    AO 3/31/2023 [facility]          115           68     88                      11:51:21                                                                      AM                                                          +----+----------+---------+-------------+-------------+---+----+-------+-------+    AO 3/31/2023 [facility]          109           72     89                      12:16:04                                                                      PM                                                          +----+----------+---------+-------------+-------------+---+----+-------+-------+        Oxygen Saturation %:  +-----------+----------+------------+  Sample SiteO2 Sat (%)HB  (g/100ml)  +-----------+----------+------------+           FA        89        14.5  +-----------+----------+------------+           RA        63        14.5  +-----------+----------+------------+           FA        89        14.5  +-----------+----------+------------+           PA        60        14.5  +-----------+----------+------------+      Cardiac Outputs:  +----------------+---------------+--------+--------------+-------------+-------+         Thermo CO      Thermo CI  Thermo       NOEMÍ CO      NOEMÍ CIFICK SV           (l/min)     (l/min/m2)      SV       (l/min)   (l/min/m2)         +----------------+---------------+--------+--------------+-------------+-------+               5.5            2.2    56.4           6.5          2.6   67.2  +----------------+---------------+--------+--------------+-------------+-------+      Vascular Resistance Calculated Values (Wood Units):  +-----+---+----+-------+----+----+---+----+----+----+-------+  PhasePVRPVRIPVR/SVRSVR SVRITPRTPRITVR TVRITPR/TVR  +-----+---+----+-------+----+----+---+----+----+----+-------+  0    1.33.2 0      [id number]        +-----+---+----+-------+----+----+---+----+----+----+-------+      Interventional Equipment:  +----------------------------------------+  Description                               [Narrative TRUNCATED]    Cardiac Scoring: No results found for this or any previous visit from the past 1825 days.    AAA : No results found for this or any previous visit from the past 1825 days.    OTHER: No results found for this or any previous visit from the past 1825 days.      The ASCVD Risk score (Radha SPARKS, et al., 2019) failed to calculate for the following reasons:    Risk score cannot be calculated because patient has a medical history suggesting prior/existing ASCVD     Assessment/Plan:   1. Heart failure with preserved left ventricular function  ECG 12 lead  (Clinic Performed)         Corey Jj 54 y.o. male with a medical history of MARV on CPAP, HfpEF, hypertension, depression and restrictive lung disease with FEV1 of 44% predicted and CAD with LHC with MARIANA x1 to ostial RCA 4/2023 in the setting of NSTEMI, TTE with normal EF, asymmetrical septal hypertrophy. Did not tolerate metoprolol due to Erectile dysfunction      NYHA class II symptoms, slight volume up  EKG with NSR and septal infarct       Plan  Limit po fluid intake    Advised to measure BP  Continue ASA   May dc Plavix   Continue atorvastatin   Continue diltiazem 180 mg every day   Continue Torsemide 40 mg qd, and take additional if needed in the future, weight based.   Start aldactone  Stop potassium   Will get an echo  Get full panel after 3 weeks with a follow up   Establish PCP care  Refer to pulmonary       Time Spent: I spent 40 minutes reviewing medical testing, obtaining medical history and counselling and educating on diagnosis and documenting clinical encounter.         ____________________________________________________________  Ivonne Gaona MD   of Medicine  Division of Cardiovascular Medicine   [facility]  [facility]

## 2025-01-17 ENCOUNTER — APPOINTMENT (OUTPATIENT)
Dept: SLEEP MEDICINE | Facility: CLINIC | Age: 55
End: 2025-01-17
Payer: COMMERCIAL

## 2025-02-04 DIAGNOSIS — I25.10 CORONARY ARTERY DISEASE INVOLVING NATIVE CORONARY ARTERY OF NATIVE HEART WITHOUT ANGINA PECTORIS: ICD-10-CM

## 2025-02-04 DIAGNOSIS — J44.9 CHRONIC OBSTRUCTIVE PULMONARY DISEASE, UNSPECIFIED COPD TYPE (MULTI): ICD-10-CM

## 2025-02-04 DIAGNOSIS — E78.2 MIXED HYPERLIPIDEMIA: ICD-10-CM

## 2025-02-04 DIAGNOSIS — I10 ESSENTIAL HYPERTENSION: ICD-10-CM

## 2025-02-04 DIAGNOSIS — I50.32 CHRONIC HEART FAILURE WITH PRESERVED EJECTION FRACTION: ICD-10-CM

## 2025-02-04 RX ORDER — DILTIAZEM HYDROCHLORIDE 180 MG/1
180 CAPSULE, COATED, EXTENDED RELEASE ORAL DAILY
Qty: 90 CAPSULE | Refills: 1 | Status: SHIPPED | OUTPATIENT
Start: 2025-02-04 | End: 2025-08-03

## 2025-02-25 ENCOUNTER — HOSPITAL ENCOUNTER (OUTPATIENT)
Dept: CARDIOLOGY | Facility: HOSPITAL | Age: 55
Discharge: HOME | End: 2025-02-25
Payer: COMMERCIAL

## 2025-02-25 ENCOUNTER — OFFICE VISIT (OUTPATIENT)
Dept: CARDIOLOGY | Facility: HOSPITAL | Age: 55
End: 2025-02-25
Payer: COMMERCIAL

## 2025-02-25 VITALS
BODY MASS INDEX: 42.8 KG/M2 | OXYGEN SATURATION: 97 % | SYSTOLIC BLOOD PRESSURE: 131 MMHG | DIASTOLIC BLOOD PRESSURE: 75 MMHG | HEART RATE: 72 BPM | WEIGHT: 315 LBS

## 2025-02-25 DIAGNOSIS — I25.10 CORONARY ARTERY DISEASE INVOLVING NATIVE CORONARY ARTERY OF NATIVE HEART WITHOUT ANGINA PECTORIS: ICD-10-CM

## 2025-02-25 DIAGNOSIS — I50.30 HEART FAILURE WITH PRESERVED LEFT VENTRICULAR FUNCTION: ICD-10-CM

## 2025-02-25 DIAGNOSIS — Z95.5 HISTORY OF HEART ARTERY STENT: ICD-10-CM

## 2025-02-25 DIAGNOSIS — I42.2 CARDIOMYOPATHY, HYPERTROPHIC (MULTI): ICD-10-CM

## 2025-02-25 DIAGNOSIS — I34.0 NONRHEUMATIC MITRAL VALVE REGURGITATION: ICD-10-CM

## 2025-02-25 DIAGNOSIS — E78.2 MIXED HYPERLIPIDEMIA: ICD-10-CM

## 2025-02-25 DIAGNOSIS — I25.10 ATHEROSCLEROSIS OF NATIVE CORONARY ARTERY OF NATIVE HEART WITHOUT ANGINA PECTORIS: ICD-10-CM

## 2025-02-25 DIAGNOSIS — I25.10 ATHEROSCLEROSIS OF NATIVE CORONARY ARTERY OF NATIVE HEART WITHOUT ANGINA PECTORIS: Primary | ICD-10-CM

## 2025-02-25 PROCEDURE — G2211 COMPLEX E/M VISIT ADD ON: HCPCS | Performed by: STUDENT IN AN ORGANIZED HEALTH CARE EDUCATION/TRAINING PROGRAM

## 2025-02-25 PROCEDURE — 93306 TTE W/DOPPLER COMPLETE: CPT | Performed by: STUDENT IN AN ORGANIZED HEALTH CARE EDUCATION/TRAINING PROGRAM

## 2025-02-25 PROCEDURE — 99215 OFFICE O/P EST HI 40 MIN: CPT | Mod: 25 | Performed by: STUDENT IN AN ORGANIZED HEALTH CARE EDUCATION/TRAINING PROGRAM

## 2025-02-25 PROCEDURE — 99215 OFFICE O/P EST HI 40 MIN: CPT | Performed by: STUDENT IN AN ORGANIZED HEALTH CARE EDUCATION/TRAINING PROGRAM

## 2025-02-25 PROCEDURE — C8929 TTE W OR WO FOL WCON,DOPPLER: HCPCS

## 2025-02-25 PROCEDURE — 2500000004 HC RX 250 GENERAL PHARMACY W/ HCPCS (ALT 636 FOR OP/ED): Performed by: STUDENT IN AN ORGANIZED HEALTH CARE EDUCATION/TRAINING PROGRAM

## 2025-02-25 RX ORDER — ATORVASTATIN CALCIUM 80 MG/1
80 TABLET, FILM COATED ORAL EVERY MORNING
Qty: 20 TABLET | Refills: 0 | Status: SHIPPED | OUTPATIENT
Start: 2025-02-25 | End: 2025-03-17

## 2025-02-25 RX ADMIN — PERFLUTREN 2.5 ML OF DILUTION: 6.52 INJECTION, SUSPENSION INTRAVENOUS at 13:56

## 2025-02-25 NOTE — PROGRESS NOTES
Primary Care Physician: No Assigned PCP Generic Provider, MD   Date of Visit: 02/25/2025  2:00 PM EST  Type of Visit: follow up       Chief Complaint:  No chief complaint on file.       HPI  Corey Jj 54 y.o. male with a medical history of MARV on CPAP, HfpEF, hypertension, depression and restrictive lung disease with FEV1 of 44% predicted and CAD with LHC with MARIANA x1 to ostial RCA 4/2023 in the setting of NSTEMI, TTE with normal EF, asymmetrical septal hypertrophy. Did not tolerate metoprolol due to Erectile dysfunction    Here today for follow up       He had a bad cold and it resolved  He is relatively active  Currently he denies  chest pain, angina or sob  No le edema   No dizziness or syncope  He feels good, compared to last visit   He does not weight himself   He does not measure BP  He hasn't seen PCP yet            Review of Systems   Review of Systems   12 points review of systems are negative expect for the above    Social History:  Social History     Socioeconomic History    Marital status:      Spouse name: Not on file    Number of children: Not on file    Years of education: Not on file    Highest education level: Not on file   Occupational History    Not on file   Tobacco Use    Smoking status: Former     Types: Cigarettes    Smokeless tobacco: Current     Types: Chew   Vaping Use    Vaping status: Never Used   Substance and Sexual Activity    Alcohol use: Never    Drug use: Never    Sexual activity: Not on file   Other Topics Concern    Not on file   Social History Narrative    Not on file     Social Drivers of Health     Financial Resource Strain: Low Risk  (11/1/2023)    Overall Financial Resource Strain (CARDIA)     Difficulty of Paying Living Expenses: Not very hard   Food Insecurity: Not on file   Transportation Needs: No Transportation Needs (11/1/2023)    PRAPARE - Transportation     Lack of Transportation (Medical): No     Lack of Transportation (Non-Medical): No   Physical Activity:  "Not on file   Stress: Not on file   Social Connections: Not on file   Intimate Partner Violence: Not on file   Housing Stability: Low Risk  (11/1/2023)    Housing Stability Vital Sign     Unable to Pay for Housing in the Last Year: No     Number of Places Lived in the Last Year: 1     Unstable Housing in the Last Year: No        Past Medical History:  Past Medical History:   Diagnosis Date    COPD (chronic obstructive pulmonary disease) (Multi)     Diabetes (Multi)     MI (myocardial infarction) (Multi)        Past Surgical History:  No past surgical history on file.    Family History:  No family history on file.     Objective:       1/16/2024    10:10 AM 3/6/2024     1:40 PM 3/6/2024     4:04 PM 4/9/2024     9:30 AM 11/15/2024     9:52 AM 12/17/2024     1:35 PM 2/25/2025     1:57 PM   Vitals   Systolic 117 159 137 140 140 154 131   Diastolic 67 92 62 75 65 79 75   BP Location     Left arm     Heart Rate 92 92 90 92 55 79 72   Temp  37.1 °C (98.7 °F)        Resp  20 18       Height  1.88 m (6' 2\")        Weight (lb) 331.6 320  329.9 349.4 338.41 333.34   BMI 42.57 kg/m2 41.09 kg/m2  42.36 kg/m2 44.86 kg/m2 43.45 kg/m2 42.8 kg/m2   BSA (m2) 2.8 m2 2.75 m2  2.8 m2 2.87 m2 2.84 m2 2.81 m2   Visit Report Report   Report Report Report Report      Constitutional:       Appearance: Healthy appearance. Not in distress.   Neck:      Vascular: No JVR. JVD normal.   Pulmonary:      Effort: Pulmonary effort is normal.      Breath sounds: Normal breath sounds. No wheezing. No rhonchi. No rales.   Chest:      Chest wall: Not tender to palpatation.   Cardiovascular:      Normal rate. Regular rhythm. Normal S1. Normal S2.       Murmurs: There is no murmur.      No gallop.  N No rub.   Pulses:     Intact distal pulses.   Edema:     Peripheral edema absent.   Abdominal:      General: Bowel sounds are normal.      Palpations: Abdomen is soft.      Tenderness: There is no abdominal tenderness.   Musculoskeletal: Normal range of motion. "         General: No tenderness.   Skin:     General: Skin is warm and dry.   Neurological:      General: No focal deficit present.      Mental Status: Alert and oriented to person, place and time.     Allergies:  No Known Allergies    Medications:  Current Outpatient Medications   Medication Instructions    albuterol 108 (90 Base) MCG/ACT inhaler 2 puffs, Every 4 hours PRN    albuterol 90 mcg/actuation inhaler 2 puffs, inhalation, Every 6 hours PRN, Use as needed, 1 to 2 puffs every 4 to 6 hours, no more than 4 times a day    aspirin 81 mg, Daily    atorvastatin (LIPITOR) 80 mg, oral, Every morning    buPROPion XL (WELLBUTRIN XL) 300 mg, Daily    dilTIAZem CD (CARDIZEM CD) 180 mg, oral, Daily    fluticasone-umeclidin-vilanter (Trelegy Ellipta) 100-62.5-25 mcg blister with device 1 puff, inhalation, Daily with breakfast, One inhalation a day, rinse mouth after use    gabapentin (NEURONTIN) 1,200 mg, Nightly    mirtazapine (REMERON) 7.5 mg, Nightly    nortriptyline (Pamelor) 75 mg capsule 2 capsules, Nightly    sertraline (ZOLOFT) 100 mg, 2 times daily    spironolactone (ALDACTONE) 25 mg, oral, Daily    tadalafil (CIALIS) 10 mg, Daily PRN    torsemide (DEMADEX) 40 mg, oral, Daily        Labs and Imaging:     Lab Results   Component Value Date    WBC 7.9 02/25/2025    HGB 11.9 (L) 02/25/2025    HCT 37.2 (L) 02/25/2025     (H) 02/25/2025    CHOL 249 (H) 02/25/2025    TRIG 338 (H) 02/25/2025    HDL 35 (L) 02/25/2025    ALT 12 02/25/2025    AST 10 02/25/2025     02/25/2025    K 3.7 02/25/2025     02/25/2025    CREATININE 1.09 02/25/2025    BUN 19 02/25/2025    CO2 28 02/25/2025    TSH 0.94 02/25/2025    INR 1.1 10/30/2023         Echocardiogram:   Echocardiogram     Narrative  [facility] [place] [id number]  Tel [phone] and Fax [phone]    TRANSTHORACIC ECHOCARDIOGRAM REPORT      Patient Name:     AMBROCIO Castillo Physician:  [id number] Ivonne  Jimenez VALDIVIA  Study Date:       3/30/2023           Referring           Anant Aditya CHRIS  Physician:  MRN/PID:          [id number]            PCP:  Accession/Order#: [id number]           [address]  Location:  YOB: 1970            Fellow:  Gender:           M                   Nurse:              Caroline Butts RN  Admit Date:       3/28/2023           Sonographer:        Celeste Hernandez [facility]  Admission Status: Inpatient - Routine Additional Staff:   Kylee Pereyra [facility]  Height:           177.80 cm           CC Report to:  Weight:           140.16 kg           Study Type:         Echocardiogram  BSA:              2.51 m2  Blood Pressure: 142 /76 mmHg    Diagnosis/ICD: I50.22-Chronic systolic (congestive) heart failure (CHF)  Indication:    Congestive Heart Failure  Procedure/CPT: Echo Complete w Full [id number]    Patient History:  Pertinent History: Dyspnea, CHF, HTN and Depression.    Study Detail: The following Echo studies were performed: 2D, M-Mode, Doppler and  color flow. Image quality for this study is poor. Technically  challenging study due to poor acoustic windows, prominent lung  artifact, body habitus and PT. coughing throughout study. Definity  used as a contrast agent for endocardial border definition. Total  contrast used for this procedure was 2 mL via IV push. The patient  was awake.      PHYSICIAN INTERPRETATION:  Left Ventricle: The left ventricular systolic function is normal, with an estimated ejection fraction of 55-60%. The calculated ejection fraction is normal at 58 % using the Barry's Bi-plane MOD calculation. There are no regional wall motion abnormalities. The left ventricular cavity size is normal. There is asymmetric left ventricular hypertrophy. Spectral Doppler shows an impaired relaxation pattern of left ventricular diastolic filling. There is an elevated mean left atrial pressure.  Left Atrium: The left atrium is normal in size.  Right  Ventricle: The right ventricle is normal in size. There is normal right ventricular global systolic function.  Right Atrium: The right atrium is normal in size.  Aortic Valve: The aortic valve is probably trileaflet. There is minimal aortic valve cusp calcification. There is no evidence of aortic valve stenosis.  There is trace to mild aortic valve regurgitation. The peak instantaneous gradient of the aortic valve is 11.2 mmHg. The mean gradient of the aortic valve is 5.0 mmHg.  Mitral Valve: The mitral valve is normal in structure. There is mild mitral valve regurgitation.  Tricuspid Valve: The tricuspid valve is structurally normal. There is trace to mild tricuspid regurgitation. The right ventricular systolic pressure is unable to be estimated.  Pulmonic Valve: The pulmonic valve is not well visualized. There is physiologic pulmonic valve regurgitation.  Pericardium: There is no pericardial effusion noted.  Aorta: The aortic root is normal.  Systemic Veins: The inferior vena cava appears to be of normal size.  In comparison to the previous echocardiogram(s): There are no prior studies on this patient for comparison purposes.      CONCLUSIONS:  1. Left ventricular systolic function is normal with a 55-60% estimated ejection fraction.  2. Spectral Doppler shows an impaired relaxation pattern of left ventricular diastolic filling.  3. There is an elevated mean left atrial pressure.  4. There is asymmetric left ventricular hypertrophy.  5. Technically diffcult study.    QUANTITATIVE DATA SUMMARY:  2D MEASUREMENTS:  Normal Ranges:  IVSd:          1.85 cm    (0.6-1.1cm)  LVPWd:         0.91 cm    (0.6-1.1cm)  LVIDd:         6.84 cm    (3.9-5.9cm)  LVIDs:         3.04 cm  LV Mass Index: 187.2 g/m2  LV % FS        55.6 %    LA VOLUME:  Normal Ranges:  LA Vol A4C:        43.1 ml    (22+/-6mL/m2)  LA Vol A2C:        48.5 ml  LA Vol BP:         48.1 ml  LA Vol Index A4C:  17.2ml/m2  LA Vol Index A2C:  19.3 ml/m2  LA Vol  Index BP:   19.1 ml/m2  LA Area A4C:       15.6 cm2  LA Area A2C:       17.4 cm2  LA Major Axis A4C: 4.8 cm  LA Major Axis A2C: 5.3 cm  LA Volume Index:   27.9 ml/m2  LA Vol A4C:        39.0 ml  LA Vol A2C:        51.5 ml    AORTA MEASUREMENTS:  Normal Ranges:  Asc Ao, d: 3.80 cm (2.1-3.4cm)    LV SYSTOLIC FUNCTION BY 2D PLANIMETRY (MOD):  Normal Ranges:  EF-A4C View: 58.1 % (>=55%)  EF-A2C View: 64.2 %  EF-Biplane:  58.3 %    LV DIASTOLIC FUNCTION:  Normal Ranges:  MV Peak E:    1.10 m/s (0.7-1.2 m/s)  MV Peak A:    1.39 m/s (0.42-0.7 m/s)  E/A Ratio:    0.79     (1.0-2.2)  MV e'         0.06 m/s (>8.0)  MV lateral e' 0.06 m/s  MV medial e'  0.06 m/s  E/e' Ratio:   18.33    (<8.0)    MITRAL VALVE:  Normal Ranges:  MV DT: 201 msec (150-240msec)    AORTIC VALVE:  Normal Ranges:  AoV Vmax:                1.67 m/s  (<=1.7m/s)  AoV Peak P.2 mmHg (<20mmHg)  AoV Mean P.0 mmHg  (1.7-11.5mmHg)  LVOT Max Patrick:            1.20 m/s  (<=1.1m/s)  AoV VTI:                 22.50 cm  (18-25cm)  LVOT VTI:                22.10 cm  LVOT Diameter:           2.80 cm   (1.8-2.4cm)  AoV Area, VTI:           6.05 cm2  (2.5-5.5cm2)  AoV Area,Vmax:           4.42 cm2  (2.5-4.5cm2)  AoV Dimensionless Index: 0.98    RIGHT VENTRICLE:  RV 1   4.0 cm  RV 2   2.78 cm  RV 3   7.05 cm  TAPSE: 25.7 mm  RV s'  0.11 m/s    TRICUSPID VALVE/RVSP:  Normal Ranges:  Peak TR Velocity: 0.79 m/s  RV Syst Pressure: 5.5 mmHg (< 30mmHg)  IVC Diam:         1.49 cm    PULMONIC VALVE:  Normal Ranges:  PV Max Patrick: 1.2 m/s  (0.6-0.9m/s)  PV Max P.2 mmHg      [id number] Ivonne Gaona MD  Electronically signed on 3/30/2023 at 11:33:52 AM         Final     Stress Testing: No results found for this or any previous visit from the past 1825 days.    Cardiac Catheterization:   Adult Cath     Narrative  [facility], Cath Lab, [id number] [place] [address]    Cardiovascular Catheterization Report    Patient Name:     AMBROCIO  ADALID Performing Physician:  [id number] Lev Moreno MD  Study Date:       3/31/2023   Verifying Physician:   [id number] Lev Moreno MD  MRN/PID:          [id number]    Cardiologist/Co-scrub:  Accession/Order#: [id number]   Fellow:  YOB: 1970    Fellow:  Gender:           M           Referring Physician:   MORGAN ESPINOZA  Admit Date:                   Referring Physician:   -  Surgeon:                      Referring Physician:   -      Study: Left and Right Heart Catheterization      Indications:  AMBROCIO CORDOBA is a 53 year old male who presents with hypertension. Acute coronary syndrome > 24 hrs, with a chest pain assessment of typical angina. Study performed as an urgent cath procedure. Heart failure.    Medical History:  Stress test performed: No. CTA performed: No. [place] accessed: No. LVEF Assessed: Yes. LVEF = 60%.    Procedure Description:  A balloon tipped catheter was advanced through the right heart to record pressures. Cardiac output was calculated via the Be method.    Coronary Angiography:  The coronary circulation is right dominant.    Left Main Coronary Artery:  The left main coronary artery is a normal caliber vessel. The left main arises normally from the left coronary sinus of Valsalva and bifurcates into the LAD and circumflex coronary arteries. The left main coronary artery showed no significant disease or stenosis greater than 30%. IVUS of the LM was performed and demonstrated no significant obstructive disease; angiographic appearance of disease likely secondary to vessel curvature.    Left Anterior Descending Coronary Artery Distribution:  The left anterior descending coronary artery is a normal caliber vessel. The LAD arises normally from the left main coronary artery. The LAD demonstrated no significant disease or stenosis greater than 30%.    Circumflex Coronary Artery Distribution:  The circumflex coronary artery is a normal caliber vessel. The circumflex arises normally from the  left main coronary artery and terminates in the AV groove. The circumflex revealed no significant disease or stenosis greater than 30%.    Right Heart Catheterization:  A balloon tipped catheter was advanced through the right heart to record pressures. Cardiac output was calculated via the Be method. Elevated left sided filling pressures with normal cardiac output. Elevated ventricular filling pressure. Cardiac output is normal. Preserved cardiac output at rest. No pulmonary vascular resistance. Elevated systemic vascular resistance.    RA 7 mmHg, 63%  RV 40/7 mmHg  PA 40/20 mmHg (27 mmHg), 60%  PCWP 20 mmHg (considerable respiratory variation)  Ao (91 mmHg), 89%  LVEDP 20 mmHg  PVR 1.2 Smart  SVR 1222 cgs.    Right Coronary Artery Distribution:    The right coronary artery is a normal caliber vessel. The RCA arises normally from the right sinus of Valsalva. The RCA showed no significant disease or stenosis greater than 30%, a normal vessel and severe ostial obstruction. The ostial right coronary artery showed 90% stenosis.      Left Ventriculography:  LVEDP 20 mmHg.    Coronary Interventions:  Angiography reveals a 90% stenosis of the ostial right coronary artery. Pre-intervention YOKO flow was 3. Percutaneous coronary intervention was performed within the ostial right coronary artery. [place] 4.5 mm x 15 mm was advanced to the lesion and implanted. The stenosis was successfully reduced from 90% to <10%. Post intervention Intravascular Ultrasound (IVUS) was performed within the ostial right coronary artery . The stent demonstrated good apposition. No thrombus was visualized. No edge dissection is visualized. Post-intervention YOKO flow was 3.    Coronary Lesion Summary:  Vessel   Stenosis   Vessel Segment  RCA    90% stenosis     ostial      Hemo Personnel:  +---------------------+-------------+  Name                 Duty           +---------------------+-------------+  Lev Moreno MD, MD  1  +---------------------+-------------+  Onofre Correa RRT        PROC SCRUB 1  +---------------------+-------------+  Marino Saxena RN      PROC CIRC 1  +---------------------+-------------+  Marino Saxena RN    PROC RECORD 1  +---------------------+-------------+  Niesha Goldsmith RT(R)PROC RECORD 2  +---------------------+-------------+      Sedation Time:  +------------------------+----------------------------------------+  Sedation Start/End TimesTime                                      +------------------------+----------------------------------------+  Start                   3/31/2023 10:34:14                        +------------------------+----------------------------------------+  Drugs                   Versed 1 mg IV per physician for sedatio  +------------------------+----------------------------------------+  End                     3/31/2023 12:24:32                        +------------------------+----------------------------------------+      Equipment Used:  +---------------------+--------------------------------------------------------+              Date/Time                                             Description  +---------------------+--------------------------------------------------------+  3/31/2023 10:32:21 AM     - [id number] 100ml bottle - Qty: 1 Each                                                                   Part #: [id number]  +---------------------+--------------------------------------------------------+  3/31/2023 10:32:41 AM Terumo 6F [place] 10cm Sheath - Qty:                                                          1 Each Part #: [id number]  +---------------------+--------------------------------------------------------+  3/31/2023 10:32:47 AM  Terumo 7F [place] 10cm Sheath - Qty:                                                          1 Each Part #:  [id number]  +---------------------+--------------------------------------------------------+  3/31/2023 10:32:58 AM  - 5 Fr x 10cm S-FERNANDO Mini Access Kit -                                                        Qty: 1 Each Part #: [id number]  +---------------------+--------------------------------------------------------+  3/31/2023 12:32:18 PM   Abbott Versacore 0.035 mm x 145 cm                         Peripheral Guide wire floppy - Qty: 1 Each Part #: [id number]  +---------------------+--------------------------------------------------------+  3/31/2023 12:32:26 PM  - InQwire 0.035mm x 150cm Guidewire,                                            3mm J tip - Qty: 1 Each Part #: 385  +---------------------+--------------------------------------------------------+  3/31/2023 12:32:49 PM  - 5 Fr x 10cm S-FERNANDO Mini Access Kit -                                                        Qty: 1 Each Part #: [id number]  +---------------------+--------------------------------------------------------+  3/31/2023 12:33:11 PM    - [place] Femoral Vein                          Insertion Cath 7Fr x 110cm, - Qty: 1 Each Part #: [id number]  +---------------------+--------------------------------------------------------+  3/31/2023 12:33:21 PM     - 6 Fr PIG Impulse - Qty: 1 Each                                                                    Part #: 353  +---------------------+--------------------------------------------------------+  3/31/2023 12:33:30 PM     - 6 Fr FR4 Impulse - Qty: 1 Each                                                                    Part #: 337  +---------------------+--------------------------------------------------------+  3/31/2023 12:33:36 PM     - 6 Fr FL4 Impulse - Qty: 1 Each                                                                    Part #: 346  +---------------------+--------------------------------------------------------+  3/31/2023 12:33:45 PM   - 6 Fr  FL4.5 Impulse - Qty: 1 Each                                                                    Part #: 347  +---------------------+--------------------------------------------------------+  3/31/2023 12:33:58 PM - 6 Fr Expo Angio WRP [place] Right                                Posterior curve 100cm - Qty: 1 Each Part #: 344  +---------------------+--------------------------------------------------------+  3/31/2023 12:34:09 PM          - RunThrough NS Coronary                            Guidewire 0.014mm x 180cm - Qty: 1 Each Part #: 489  +---------------------+--------------------------------------------------------+  3/31/2023 12:34:15 PM          - RunThrough NS Coronary                            Guidewire 0.014mm x 180cm - Qty: 1 Each Part #: 489  +---------------------+--------------------------------------------------------+  3/31/2023 12:34:33 PM     - 6 Fr 3DRIGHT [place] Guide x 100cm -                                                         Qty: 1 Each Part #: 11  +---------------------+--------------------------------------------------------+  3/31/2023 12:34:47 PM   - 6 Fr JL4.5 curve Jimenez Guide x 100cm -                                                         Qty: 1 Each Part #: 45  +---------------------+--------------------------------------------------------+  3/31/2023 12:34:55 PM [place] [facility] [place] ST IVUS Catheter 150cm                                                 - Qty: 1 Each Part #: [id number]  +---------------------+--------------------------------------------------------+  3/31/2023 12:35:11 PM  Medtronic Vikram [place] RX MARIANA 4.5mm X 15mm -                                              Qty: 1 Each Part #: [id number]  +---------------------+--------------------------------------------------------+  3/31/2023 12:35:21 PM  - 6 Fr Angio-Seal VIP vascular closure                                                       - Qty: 1 Each  Part #: 79  +---------------------+--------------------------------------------------------+      Fluoroscopy Time:  +--------------------------+---------+  X-Ray Summary Fluoro Time:29.70 min  +--------------------------+---------+      +----------+---------+  Contrast: Dose:      +----------+---------+  Omnipaque:190.00 ml  +----------+---------+      Hemodynamic Pressures:    +----+----------+---------+-------------+-------------+---+----+-------+-------+  SiteDate Time   Phase  Systolic mmHg  Diastolic  ED MeanA-Wave V-Wave                   Name                    mmHg     mmHmmHg mmHg   mmHg                                                      g                     +----+----------+---------+-------------+-------------+---+----+-------+-------+    RA 3/31/2023 AIR REST                                5      8      5        11:07:20                                                                      AM                                                          +----+----------+---------+-------------+-------------+---+----+-------+-------+    RV 3/31/2023 AIR REST           36            1  6                          11:07:51                                                                      AM                                                          +----+----------+---------+-------------+-------------+---+----+-------+-------+    PW 3/31/2023 AIR REST                               14     17     18        11:09:02                                                                      AM                                                          +----+----------+---------+-------------+-------------+---+----+-------+-------+    PA 3/31/2023 AIR REST           31            9     21                      11:09:42                                                                       AM                                                          +----+----------+---------+-------------+-------------+---+----+-------+-------+    PA 3/31/2023 AIR REST           35           12     21                      11:12:19                                                                      AM                                                          +----+----------+---------+-------------+-------------+---+----+-------+-------+    LV 3/31/2023 AIR REST          113            3 16                          11:16:27                                                                      AM                                                          +----+----------+---------+-------------+-------------+---+----+-------+-------+    LV 3/31/2023 AIR REST          114            4 17                          11:17:03                                                                      AM                                                          +----+----------+---------+-------------+-------------+---+----+-------+-------+    LV 3/31/2023 [facility] REST          112            5 16                          11:18:24                                                                      AM                                                          +----+----------+---------+-------------+-------------+---+----+-------+-------+    LV 3/31/2023 [facility]          118            4 13                          11:18:38                                                                      AM                                                          +----+----------+---------+-------------+-------------+---+----+-------+-------+    LV 3/31/2023 [facility]          109            9 17                          11:20:14                                                                       AM                                                          +----+----------+---------+-------------+-------------+---+----+-------+-------+     3/31/2023 [facility] REST          115            4 14                          11:22:51                                                                      AM                                                          +----+----------+---------+-------------+-------------+---+----+-------+-------+     3/31/2023 [facility]          118            8 16                          11:23:16                                                                      AM                                                          +----+----------+---------+-------------+-------------+---+----+-------+-------+     3/31/2023 [facility]          113            3 11                          11:23:30                                                                      AM                                                          +----+----------+---------+-------------+-------------+---+----+-------+-------+     3/31/2023 [facility]          112            4 15                          11:23:37                                                                      AM                                                          +----+----------+---------+-------------+-------------+---+----+-------+-------+     3/31/2023 [facility]          109            6 13                          11:23:44                                                                      AM                                                          +----+----------+---------+-------------+-------------+---+----+-------+-------+    LV 3/31/2023 [facility]          108            5 19                          11:23:50                                                                       AM                                                          +----+----------+---------+-------------+-------------+---+----+-------+-------+   LVp 3/31/2023 [facility]          117            2 13                          11:24:39                                                                      AM                                                          +----+----------+---------+-------------+-------------+---+----+-------+-------+   AOp 3/31/2023 [facility]          123           68     91                      11:24:46                                                                      AM                                                          +----+----------+---------+-------------+-------------+---+----+-------+-------+    AO 3/31/2023 [facility]          115           68     89                      11:27:34                                                                      AM                                                          +----+----------+---------+-------------+-------------+---+----+-------+-------+    AO 3/31/2023 [facility]          115           68     88                      11:51:21                                                                      AM                                                          +----+----------+---------+-------------+-------------+---+----+-------+-------+    AO 3/31/2023 [facility]          109           72     89                      12:16:04                                                                      PM                                                          +----+----------+---------+-------------+-------------+---+----+-------+-------+        Oxygen Saturation %:  +-----------+----------+------------+  Sample SiteO2 Sat (%)HB  (g/100ml)  +-----------+----------+------------+           FA        89        14.5  +-----------+----------+------------+           RA        63        14.5  +-----------+----------+------------+           FA        89        14.5  +-----------+----------+------------+           PA        60        14.5  +-----------+----------+------------+      Cardiac Outputs:  +----------------+---------------+--------+--------------+-------------+-------+         Thermo CO      Thermo CI  Thermo       NOEMÍ CO      NOEMÍ CIFICK SV           (l/min)     (l/min/m2)      SV       (l/min)   (l/min/m2)         +----------------+---------------+--------+--------------+-------------+-------+               5.5            2.2    56.4           6.5          2.6   67.2  +----------------+---------------+--------+--------------+-------------+-------+      Vascular Resistance Calculated Values (Wood Units):  +-----+---+----+-------+----+----+---+----+----+----+-------+  PhasePVRPVRIPVR/SVRSVR SVRITPRTPRITVR TVRITPR/TVR  +-----+---+----+-------+----+----+---+----+----+----+-------+  0    1.33.2 0      [id number]        +-----+---+----+-------+----+----+---+----+----+----+-------+      Interventional Equipment:  +----------------------------------------+  Description                               [Narrative TRUNCATED]    Cardiac Scoring: No results found for this or any previous visit from the past 1825 days.    AAA : No results found for this or any previous visit from the past 1825 days.    OTHER: No results found for this or any previous visit from the past 1825 days.      The ASCVD Risk score (Radha SPARKS, et al., 2019) failed to calculate for the following reasons:    Risk score cannot be calculated because patient has a medical history suggesting prior/existing ASCVD     Assessment/Plan:   1. Atherosclerosis of native coronary artery of native heart without  angina pectoris        2. Heart failure with preserved left ventricular function        3. Coronary artery disease involving native coronary artery of native heart without angina pectoris  atorvastatin (Lipitor) 80 mg tablet      4. Nonrheumatic mitral valve regurgitation        5. Cardiomyopathy, hypertrophic (Multi)        6. Mixed hyperlipidemia           Corey Jj 54 y.o. male with a medical history of MARV on CPAP, HfpEF, hypertension, depression and restrictive lung disease with FEV1 of 44% predicted and CAD with LHC with MARIANA x1 to ostial RCA 4/2023 in the setting of NSTEMI, TTE with normal EF, asymmetrical septal hypertrophy. Did not tolerate metoprolol due to Erectile dysfunction      Echo today, normal LVEF, HCM (base-mid septum measures 1.8cm) with no apparent obstructive physiology (no provocation done and images are sub-optimal), mild to moderate MR, AVS, mild to moderate AI  Euvolemic NYHA class II      Plan  Limit po fluid intake     Advised to measure BP and do daily weights  Continue ASA    Continue atorvastatin, will repeat lipid panel     Continue diltiazem 180 mg every day    Continue Torsemide 40 mg qd, and take additional if needed in the future, weight based.    Continue aldactone   Advised to go the lab and get full panel done (it was ordered last visit). Based on lab results, will add Jardiance and refer to clinical pharmacy. No labs done over the last 2 years    Recommend first degree relatives screening for HCM, recommend baseline EKG and echo   Advised to establish PCP care   Referred to pulmonary      High complex         ____________________________________________________________  Ivonne Gaona MD   of Medicine  Division of Cardiovascular Medicine   [facility] [place]  [facility]

## 2025-02-26 ENCOUNTER — TELEPHONE (OUTPATIENT)
Dept: CARDIOLOGY | Facility: HOSPITAL | Age: 55
End: 2025-02-26
Payer: COMMERCIAL

## 2025-02-26 DIAGNOSIS — D64.9 ANEMIA, UNSPECIFIED TYPE: ICD-10-CM

## 2025-02-26 DIAGNOSIS — I50.30 HEART FAILURE WITH PRESERVED LEFT VENTRICULAR FUNCTION: ICD-10-CM

## 2025-02-26 PROBLEM — I34.0 NONRHEUMATIC MITRAL VALVE REGURGITATION: Status: ACTIVE | Noted: 2025-02-26

## 2025-02-26 PROBLEM — E78.2 MIXED HYPERLIPIDEMIA: Status: ACTIVE | Noted: 2025-02-26

## 2025-02-26 PROBLEM — I42.2 CARDIOMYOPATHY, HYPERTROPHIC (MULTI): Status: ACTIVE | Noted: 2025-02-26

## 2025-02-26 LAB
ALBUMIN SERPL-MCNC: 4.1 G/DL (ref 3.6–5.1)
ALP SERPL-CCNC: 106 U/L (ref 35–144)
ALT SERPL-CCNC: 12 U/L (ref 9–46)
ANION GAP SERPL CALCULATED.4IONS-SCNC: 10 MMOL/L (CALC) (ref 7–17)
AORTIC VALVE MEAN GRADIENT: 10 MMHG
AORTIC VALVE PEAK VELOCITY: 2.09 M/S
AST SERPL-CCNC: 10 U/L (ref 10–35)
AV PEAK GRADIENT: 17 MMHG
AVA (PEAK VEL): 2.25 CM2
AVA (VTI): 2.41 CM2
BILIRUB SERPL-MCNC: 0.4 MG/DL (ref 0.2–1.2)
BNP SERPL-MCNC: 83 PG/ML
BUN SERPL-MCNC: 19 MG/DL (ref 7–25)
CALCIUM SERPL-MCNC: 9 MG/DL (ref 8.6–10.3)
CHLORIDE SERPL-SCNC: 102 MMOL/L (ref 98–110)
CHOLEST SERPL-MCNC: 249 MG/DL
CHOLEST/HDLC SERPL: 7.1 (CALC)
CO2 SERPL-SCNC: 28 MMOL/L (ref 20–32)
CREAT SERPL-MCNC: 1.09 MG/DL (ref 0.7–1.3)
EGFRCR SERPLBLD CKD-EPI 2021: 81 ML/MIN/1.73M2
EJECTION FRACTION APICAL 4 CHAMBER: 56
EJECTION FRACTION: 56 %
ERYTHROCYTE [DISTWIDTH] IN BLOOD BY AUTOMATED COUNT: 14.9 % (ref 11–15)
GLUCOSE SERPL-MCNC: 97 MG/DL (ref 65–139)
HCT VFR BLD AUTO: 37.2 % (ref 38.5–50)
HDLC SERPL-MCNC: 35 MG/DL
HGB BLD-MCNC: 11.9 G/DL (ref 13.2–17.1)
LDLC SERPL CALC-MCNC: 162 MG/DL (CALC)
LEFT ATRIUM VOLUME AREA LENGTH INDEX BSA: 45.9 ML/M2
LEFT VENTRICLE INTERNAL DIMENSION DIASTOLE: 5.96 CM (ref 3.5–6)
LEFT VENTRICULAR OUTFLOW TRACT DIAMETER: 2 CM
MCH RBC QN AUTO: 25.8 PG (ref 27–33)
MCHC RBC AUTO-ENTMCNC: 32 G/DL (ref 32–36)
MCV RBC AUTO: 80.5 FL (ref 80–100)
MITRAL VALVE E/A RATIO: 1.1
NONHDLC SERPL-MCNC: 214 MG/DL (CALC)
PLATELET # BLD AUTO: 444 THOUSAND/UL (ref 140–400)
PMV BLD REES-ECKER: 10.4 FL (ref 7.5–12.5)
POTASSIUM SERPL-SCNC: 3.7 MMOL/L (ref 3.5–5.3)
PROT SERPL-MCNC: 6.8 G/DL (ref 6.1–8.1)
RBC # BLD AUTO: 4.62 MILLION/UL (ref 4.2–5.8)
RIGHT VENTRICLE FREE WALL PEAK S': 13.1 CM/S
RIGHT VENTRICLE PEAK SYSTOLIC PRESSURE: 31.9 MMHG
SODIUM SERPL-SCNC: 140 MMOL/L (ref 135–146)
TRICUSPID ANNULAR PLANE SYSTOLIC EXCURSION: 2.3 CM
TRIGL SERPL-MCNC: 338 MG/DL
TSH SERPL-ACNC: 0.94 MIU/L (ref 0.4–4.5)
WBC # BLD AUTO: 7.9 THOUSAND/UL (ref 3.8–10.8)

## 2025-02-26 NOTE — TELEPHONE ENCOUNTER
----- Message from Ivonne Gaona sent at 2/26/2025  9:57 AM EST -----  Please let him know the results of his labs. Please start him on Jardiance 10 and refer to clinical pharmacy. Have him repeat BMP after 2-3 weeks.  His lipids is all over the place. Is he taking lipitor? If he is taking it, he needs to be on repatha   He has mild anemia, needs to see a PCP  ----- Message -----  From: Pamela [employer] Results In  Sent: 2/26/2025   5:13 AM EST  To: Ivonne Gaona MD

## 2025-03-19 ENCOUNTER — APPOINTMENT (OUTPATIENT)
Dept: PULMONOLOGY | Facility: CLINIC | Age: 55
End: 2025-03-19
Payer: COMMERCIAL

## 2025-04-01 ENCOUNTER — APPOINTMENT (OUTPATIENT)
Dept: PHARMACY | Facility: HOSPITAL | Age: 55
End: 2025-04-01
Payer: COMMERCIAL

## 2025-04-01 DIAGNOSIS — I50.30 HEART FAILURE WITH PRESERVED LEFT VENTRICULAR FUNCTION: ICD-10-CM

## 2025-04-01 NOTE — Clinical Note
Clint Gaona,  Today I spoke with Annita (wife) to discuss Corey's heart medications and  cost assistance program. Patient is not currently taking Jardiance due to the cost of medication. Wife confirms patient is taking spironolactone 25mg daily and torsemide 40mg daily. She reports adherence, no adverse effects and she reports patient is not experiencing any worsening s/s of heart failure. Unfortunately patient does not have prescription insurance therefore is ineligible for  PAP. Discussed [employer]  PAP application for Jardiance. Application was sent through mail for wife to complete. Plan to follow up in 2 months to check on application status. Thank you!

## 2025-04-01 NOTE — ASSESSMENT & PLAN NOTE
Patient currently taking 1 of 4 GDMT, unfortunately cannot afford Jardiance. Unable to assess home BP. Renal function and potassium level appropriate to continue current regimen as prescribed.     Labs (02/25/2025): Scr-1.09 eGFR-81 K-3.7

## 2025-04-01 NOTE — PROGRESS NOTES
Pharmacist Clinic: Cardiology Management    Corey Jj is a 54 y.o. male was referred to Clinical Pharmacy Team for heart failure management.     Referring Provider: Ivonne Gaona MD    THIS IS A NEW PATIENT APPOINTMENT. PATIENT WILL BE ESTABLISHING CARE WITH CLINICAL PHARMACY.    Appointment was completed by Annita (wife) who was reached at primary number.    Allergies Reviewed? Yes    No Known Allergies    Past Medical History:   Diagnosis Date    COPD (chronic obstructive pulmonary disease) (Multi)     Diabetes (Multi)     MI (myocardial infarction) (Multi)        Current Outpatient Medications on File Prior to Visit   Medication Sig Dispense Refill    aspirin 81 mg EC tablet Take 1 tablet (81 mg) by mouth once daily.      buPROPion XL (Wellbutrin XL) 300 mg 24 hr tablet Take 1 tablet (300 mg) by mouth once daily.      dilTIAZem CD (Cardizem CD) 180 mg 24 hr capsule Take 1 capsule (180 mg) by mouth once daily. 90 capsule 1    nortriptyline (Pamelor) 75 mg capsule Take 2 capsules (150 mg) by mouth once daily at bedtime.      sertraline (Zoloft) 100 mg tablet Take 1 tablet (100 mg) by mouth 2 times a day.      spironolactone (Aldactone) 25 mg tablet Take 1 tablet (25 mg) by mouth once daily. 30 tablet 11    tadalafil (Cialis) 10 mg tablet Take 1 tablet (10 mg) by mouth once daily as needed for erectile dysfunction.      albuterol 90 mcg/actuation inhaler Inhale 2 puffs every 6 hours if needed for wheezing or shortness of breath. Use as needed, 1 to 2 puffs every 4 to 6 hours, no more than 4 times a day 18 g 11    atorvastatin (Lipitor) 80 mg tablet Take 1 tablet (80 mg) by mouth once daily in the morning for 20 days. 20 tablet 0    empagliflozin (Jardiance) 10 mg Take 1 tablet (10 mg) by mouth once daily. (Patient not taking: Reported on 4/1/2025) 30 tablet 11    fluticasone-umeclidin-vilanter (Trelegy Ellipta) 100-62.5-25 mcg blister with device Inhale 1 puff once daily with breakfast. One inhalation a day,  "rinse mouth after use (Patient not taking: Reported on 4/1/2025) 60 each 11    gabapentin (Neurontin) 600 mg tablet Take 2 tablets (1,200 mg) by mouth once daily at bedtime.      mirtazapine (Remeron) 7.5 mg tablet Take 1 tablet (7.5 mg) by mouth once daily at bedtime.      torsemide (Demadex) 20 mg tablet Take 2 tablets (40 mg) by mouth once daily. 60 tablet 0    [DISCONTINUED] albuterol 108 (90 Base) MCG/ACT inhaler Inhale 2 puffs every 4 hours if needed.       No current facility-administered medications on file prior to visit.         RELEVANT LAB RESULTS:  Lab Results   Component Value Date    BILITOT 0.4 02/25/2025    CALCIUM 9.0 02/25/2025    CO2 28 02/25/2025     02/25/2025    CREATININE 1.09 02/25/2025    GLUCOSE 97 02/25/2025    ALKPHOS 106 02/25/2025    K 3.7 02/25/2025    PROT 6.8 02/25/2025     02/25/2025    AST 10 02/25/2025    ALT 12 02/25/2025    BUN 19 02/25/2025    ANIONGAP 10 02/25/2025    MG 2.15 11/01/2023    PHOS 4.1 11/01/2023     10/31/2023    ALBUMIN 4.1 02/25/2025    GFRMALE 70 03/31/2023     Lab Results   Component Value Date    TRIG 338 (H) 02/25/2025    CHOL 249 (H) 02/25/2025    LDLCALC 162 (H) 02/25/2025    HDL 35 (L) 02/25/2025     No results found for: \"BMCBC\", \"CBCDIF\"     PHARMACEUTICAL ASSESSMENT:    MEDICATION RECONCILIATION    Was a medication reconciliation completed at this visit? Yes  Home Pharmacy Reviewed? Yes, describe: [facility]; [place], [place]    Drug Interactions? No    Medication Documentation Review Audit       Reviewed by Sadaf Stewart MA (Medical Assistant) on 02/25/25 at 1357      Medication Order Taking? Sig Documenting Provider Last Dose Status   albuterol 108 (90 Base) MCG/ACT inhaler [id number] Yes Inhale 2 puffs every 4 hours if needed. Historical Provider, MD  Active   albuterol 90 mcg/actuation inhaler [id number]  Inhale 2 puffs every 6 hours if needed for wheezing or shortness of breath. Use as needed, 1 to 2 puffs every 4 to 6 " hours, no more than 4 times a day CHAD Hill   02/15/24 2359   aspirin 81 mg EC tablet [id number] Yes Take 1 tablet (81 mg) by mouth once daily. sometimes Sho Wyatt MD  Active   atorvastatin (Lipitor) 80 mg tablet [id number]  Take 1 tablet (80 mg) by mouth once daily in the morning for 20 days.   Patient not taking: Reported on 2025    Ivonne Gaona MD  Active   buPROPion XL (Wellbutrin XL) 300 mg 24 hr tablet [id number] Yes Take 1 tablet (300 mg) by mouth once daily. Sho Wyatt MD  Active   dilTIAZem CD (Cardizem CD) 180 mg 24 hr capsule [id number] Yes Take 1 capsule (180 mg) by mouth once daily. Ivonne Gaona MD  Active   fluticasone-umeclidin-vilanter (Trelegy Ellipta) 100-62.5-25 mcg blister with device [id number] Yes Inhale 1 puff once daily with breakfast. One inhalation a day, rinse mouth after use CHAD Hill  Active   gabapentin (Neurontin) 600 mg tablet [id number]  Take 2 tablets (1,200 mg) by mouth once daily at bedtime. Historical MD Edmundo   24 2359   mirtazapine (Remeron) 7.5 mg tablet [id number]  Take 1 tablet (7.5 mg) by mouth once daily at bedtime. Sho Wyatt MD   25 2359   nortriptyline (Pamelor) 75 mg capsule [id number] Yes Take 2 capsules (150 mg) by mouth once daily at bedtime. Sho Wyatt MD  Active   sertraline (Zoloft) 100 mg tablet [id number] Yes Take 1 tablet (100 mg) by mouth 2 times a day. Sho Provider, MD  Active   spironolactone (Aldactone) 25 mg tablet [id number] Yes Take 1 tablet (25 mg) by mouth once daily. Ivonne Gaona MD  Active   tadalafil (Cialis) 10 mg tablet [id number] Yes Take 1 tablet (10 mg) by mouth once daily as needed for erectile dysfunction. Sho Provider, MD  Active   torsemide (Demadex) 20 mg tablet [id number] Yes Take 2 tablets (40 mg) by mouth once daily. Ivonne Gaona MD  Active                    DISEASE MANAGEMENT ASSESSMENT:     CHF ASSESSMENT      Symptom/Staging:  -Most recent ejection fraction: 56%  -NYHA Stage: Class II    Results for orders placed during the hospital encounter of 02/25/25    Transthoracic echo (TTE) complete    Narrative  [facility], [address] [place] [address]  Tel [phone] and Fax [phone]    TRANSTHORACIC ECHOCARDIOGRAM REPORT      Patient Name:       AMBROCIO CORDOBA         Reading Physician:    Lu Soni MD  Study Date:         2/25/2025           Ordering Provider:    Lu SONI  MRN/PID:            [id number]            Fellow:  Accession#:         [id number]        Nurse:                Caroline Butts RN  Date of Birth/Age:  1970 / 54 years Sonographer:          Rona Adkins RDCS  Gender assigned at                     Additional Staff:  Birth:  Height:             187.96 cm           Admit Date:  Weight:             153.32 kg           Admission Status:     Outpatient  BSA / BMI:          2.72 m2 / 43.40     Encounter#:           [id number]  kg/m2  Blood Pressure:     153/70 mmHg         Department Location:  [facility] Non  Invasive    Study Type:    TRANSTHORACIC ECHO (TTE) COMPLETE  Diagnosis/ICD: Unspecified diastolic (congestive) heart failure (CHF)-I50.30;  Presence of coronary angioplasty implant and graft (stent)-Z95.5;  Atherosclerotic heart disease of native coronary artery without  angina pectoris-I25.10  Indication:    Congestive Heart Failure, CAD, Cardiac stent  CPT Code:      Echo Complete w Full Doppler-80122    Patient History:  Pertinent History: CHF, CAD, Dyspnea and HTN. Cardiac stent.    Study Detail: The following Echo studies were performed: 2D, M-Mode, Doppler and  color flow. Technically challenging study due to body habitus.  Definity used as a contrast agent for endocardial border  definition. Total contrast used for this procedure was 2.5 mL via  IV push. The patient was awake.      PHYSICIAN INTERPRETATION:  Left Ventricle: The left  ventricular systolic function is normal, with a Barry's biplane calculated ejection fraction of 56%. There is left ventricular hypertrophy involving the basal wall and mid wall, with an asymmetric distribution. There are no regional left ventricular wall motion abnormalities. The left ventricular cavity size is mildly dilated. There is mildly increased posterior left ventricular wall thickness. Findings are consistent with hypertrophic cardiomyopathy. Spectral Doppler shows a Grade II (pseudonormal pattern) of left ventricular diastolic filling with an elevated left atrial pressure. No apparent obstructive physiology, no provocation was done.  Left Atrium: The left atrial size is severely dilated. There is evidence of an atrial septal aneurysm.  Right Ventricle: The right ventricle is normal in size. There is normal right ventricular global systolic function.  Right Atrium: The right atrium is mildly dilated.  Aortic Valve: The aortic valve is trileaflet. There is mild aortic valve cusp calcification. There is evidence of mildly elevated transaortic gradients consistent with sclerosis of the aortic valve. The aortic valve dimensionless index is 0.77. There is mild to moderate aortic valve regurgitation. The peak instantaneous gradient of the aortic valve is 17 mmHg. The mean gradient of the aortic valve is 10 mmHg.  Mitral Valve: The mitral valve is mildly thickened. The peak instantaneous gradient of the mitral valve is 13 mmHg. There is mild to moderate mitral valve regurgitation. Valve is not well visualized.  No VITO noted on M-Mode.  Tricuspid Valve: The tricuspid valve is structurally normal. There is mild tricuspid regurgitation. The right ventricular systolic pressure is unable to be estimated.  Pulmonic Valve: The pulmonic valve is structurally normal. There is trace pulmonic valve regurgitation.  Pericardium: There is no pericardial effusion noted.  Aorta: The aortic root is normal. The aortic root is at  the upper limits of normal size.      CONCLUSIONS:  1. The left ventricular systolic function is normal, with a Barry's biplane calculated ejection fraction of 56%.  2. Spectral Doppler shows a Grade II (pseudonormal pattern) of left ventricular diastolic filling with an elevated left atrial pressure.  3. Left ventricular cavity size is mildly dilated.  4. There is hypertrophic cardiomyopathy.  5. There is normal right ventricular global systolic function.  6. The left atrial size is severely dilated.  7. Mild to moderate mitral valve regurgitation.  8. Aortic valve sclerosis.  9. Mild to moderate aortic valve regurgitation.    QUANTITATIVE DATA SUMMARY:    2D MEASUREMENTS:          Normal Ranges:  Ao Root d:       3.20 cm  (2.0-3.7cm)  IVSd:            1.80 cm  (0.6-1.1cm)  LVPWd:           1.30 cm  (0.6-1.1cm)  LVIDd:           5.96 cm  (3.9-5.9cm)  LVIDs:           4.19 cm  LV Mass Index:   163 g/m2  LVEDV Index:     90 ml/m2  LV % FS          29.7 %      LEFT ATRIUM:                  Normal Ranges:  LA Vol A4C:        116.3 ml   (22+/-6mL/m2)  LA Vol A2C:        130.7 ml  LA Vol BP:         124.7 ml  LA Vol Index A4C:  42.8ml/m2  LA Vol Index A2C:  48.1 ml/m2  LA Vol Index BP:   45.9 ml/m2  LA Area A4C:       32.9 cm2  LA Area A2C:       34.5 cm2  LA Major Axis A4C: 7.9 cm  LA Major Axis A2C: 7.7 cm  LA Volume Index:   44.1 ml/m2  LA Vol A4C:        111.0 ml  LA Vol A2C:        127.0 ml  LA Vol Index BSA:  43.8 ml/m2      RIGHT ATRIUM:          Normal Ranges:  RA Area A4C:  23.4 cm2      M-MODE MEASUREMENTS:         Normal Ranges:  Ao Root:             3.30 cm (2.0-3.7cm)  LAs:                 4.30 cm (2.7-4.0cm)      AORTA MEASUREMENTS:         Normal Ranges:  Ao Sinus, d:        3.20 cm (2.1-3.5cm)  Asc Ao, d:          3.70 cm (2.1-3.4cm)      LV SYSTOLIC FUNCTION:  Normal Ranges:  EF-A4C View:    56 % (>=55%)  EF-A2C View:    53 %  EF-Biplane:     56 %  LV EF Reported: 56 %      LV DIASTOLIC FUNCTION:              Normal Ranges:  MV Peak E:             0.94 m/s    (0.7-1.2 m/s)  MV Peak A:             0.85 m/s    (0.42-0.7 m/s)  E/A Ratio:             1.10        (1.0-2.2)  MV e'                  0.072 m/s   (>8.0)  MV lateral e'          0.08 m/s  MV medial e'           0.06 m/s  MV A Dur:              129.00 msec  E/e' Ratio:            13.04       (<8.0)  PulmV Sys Patrick:         41.50 cm/s  PulmV Cash Patrick:        41.80 cm/s  PulmV S/D Patrick:         0.90  PulmV A Revs Patrick:      26.40 cm/s  PulmV A Revs Dur:      119.00 msec      MITRAL VALVE:           Normal Ranges:  MV Vmax:      1.82 m/s  (<=1.3m/s)  MV peak P.2 mmHg (<5mmHg)  MV mean P.5 mmHg  (<2mmHg)  MV DT:        315 msec  (150-240msec)      MITRAL INSUFFICIENCY:             Normal Ranges:  MR VTI:               167.50 cm  MR Vmax:              557.50 cm/s      AORTIC VALVE:                      Normal Ranges:  AoV Vmax:                2.09 m/s  (<=1.7m/s)  AoV Peak P.5 mmHg (<20mmHg)  AoV Mean PG:             10.0 mmHg (1.7-11.5mmHg)  LVOT Max Patrick:            1.50 m/s  (<=1.1m/s)  AoV VTI:                 41.80 cm  (18-25cm)  LVOT VTI:                32.00 cm  LVOT Diameter:           2.00 cm   (1.8-2.4cm)  AoV Area, VTI:           2.41 cm2  (2.5-5.5cm2)  AoV Area,Vmax:           2.25 cm2  (2.5-4.5cm2)  AoV Dimensionless Index: 0.77      AORTIC INSUFFICIENCY:  AI Vmax:       4.15 m/s  AI Half-time:  353 msec  AI Decel Rate: 345.00 cm/s2      RIGHT VENTRICLE:  RV Basal 3.51 cm  RV Mid   1.86 cm  RV Major 8.7 cm  TAPSE:   23.1 mm  RV s'    0.13 m/s      TRICUSPID VALVE/RVSP:          Normal Ranges:  Peak TR Velocity:     2.69 m/s  RV Syst Pressure:     32 mmHg  (< 30mmHg)  IVC Diam:             1.40 cm      PULMONIC VALVE:          Normal Ranges:  PV Max Patrick:     1.2 m/s  (0.6-0.9m/s)  PV Max P.6 mmHg      PULMONARY VEINS:  PulmV A Revs Dur: 119.00 msec  PulmV A Revs Patrick: 26.40 cm/s  PulmV Cash Patrick:   41.80 cm/s  PulmV S/D  "Patrick:    0.90  PulmV Sys Patrick:    41.50 cm/s      [id number] Ivonne Gaona MD  Electronically signed on 2/26/2025 at 9:51:29 AM        ** Final (Updated) **      Guideline-Directed Medical Therapy:  -ARNI: No   -If no, then ACEi/ARB?: No  -Beta Blocker: No-- did not tolerate metoprolol due to erectile dysfunction  -MRA: Yes, describe: Spironolactone 25mg daily  -SGLT2i: Yes, describe: Jardiance 10mg daily - prescribed but not taking due to cost    Secondary Prevention:  -The ASCVD Risk score (Radha SPARKS, et al., 2019) failed to calculate for the following reasons:    Risk score cannot be calculated because patient has a medical history suggesting prior/existing ASCVD   -Aspirin 81mg? yes  -Statin?: Yes, describe: Atorvastatin 80mg daily  -HTN?: Yes, describe: 131/75 at last OV    CURRENT PHARMACOTHERAPY:   Spironolactone 25mg daily  Torsemide 40mg daily, may take an additional if needed for weight gain    Affordability:  PAP screen  Adherence/Compliance: reports adherence  Adverse Effects: none reported    Monitoring Weights at Home: No  Home Weight Recordings: unable to assess    Past In Office Weight Readings:   Wt Readings from Last 6 Encounters:   02/25/25 (!) 151 kg (333 lb 5.4 oz)   12/17/24 (!) 154 kg (338 lb 6.5 oz)   11/15/24 (!) 158 kg (349 lb 6.4 oz)   04/09/24 150 kg (329 lb 14.4 oz)   03/06/24 145 kg (320 lb)   01/16/24 (!) 150 kg (331 lb 9.6 oz)       Monitoring Blood Pressure at Home: No  Home BP Recordings: unable to assess    Past In Office BP Readings:   BP Readings from Last 6 Encounters:   02/25/25 131/75   12/17/24 154/79   11/15/24 140/65   04/09/24 140/75   03/06/24 137/62   01/16/24 117/67       HEALTH MANAGEMENT    Maintaining fluid restriction (<2 L/day): wife is unsure  Edema/swelling: per wife \"I don't think so\"  Shortness of breath: Yes- on exertion, doing farm work  Trouble sleeping/lying down: No; uses CPAP nightly   Dry/hacking cough: No  Recent Hospitalizations: No    EDUCATION/COUNSELING: "   - Counseled patient on MOA, expectations, duration of therapy, contraindications, administration, and monitoring parameters  - Counseled patient on lifestyle modifications that can decrease your risk of having complications (smoking cessation, losing weight, daily weights, vaccines)  - Counseled patient on fluid intake and weight management. Recommended to not consume more than 2 liters of fliuids per day. If they have gained more than 2-3 pounds within a 24 hours period (or 5 pounds in a week), contact their cardiologist  - Answered all patient questions and concerns       DISCUSSION/NOTES:   Today was an initial visit conducted with Annita (wife) to establish with clinical pharmacy. Patient medications and allergies were reviewed and updated. Patient is not currently taking Jardiance due to the cost of medication.  Wife states patient is currently taking spironolactone 25mg daily and torsemide 40mg daily. Wife reports adherence, no adverse effects and she reports patient is not experiencing any worsening s/s of heart failure. Patient does not take home BP or weigh himself.  Patient was screened for  PAP, unfortunately patient does not have prescription insurance therefore is ineligible for  PAP. Discussed [employer]  PAP application for Jardiance. [facility] mailed application to wife who understands to read directions thoroughly, fill out patient section and then turn application in to Dr. Gaona's office.    ASSESSMENT:  Unfortunately, at this time, Corey Jj is ineligible to receive financial assistance through  Patient Assistance Program because patient does not have prescription insurance.    Patient was notified of ineligibility and given the following options: Complete [employer]  application    Referring provider will be notified of denial.     Carlyn Simpson, PharmD    Assessment/Plan   Problem List Items Addressed This Visit       Heart failure with preserved left ventricular  function     Patient currently taking 1 of 4 GDMT, unfortunately cannot afford Jardiance. Unable to assess home BP. Renal function and potassium level appropriate to continue current regimen as prescribed.     Labs (02/25/2025): Scr-1.09 eGFR-81 K-3.7         Relevant Orders    Referral to Clinical Pharmacy         RECOMMENDATIONS/PLAN:    CONTINUE  Spironolactone 25mg daily  Jardiance 10mg daily  Torsemide 40mg daily    Last Appnt with Referring Provider: 02/25/2025  Next Appnt with Referring Provider: 08/26/2025  Clinical Pharmacist follow up: 2 months  VAF/Application Expiration: No- denial  Type of Encounter: Erick Simpson, PharmD    Verbal consent to manage patient's drug therapy was obtained from the patient . They were informed they may decline to participate or withdraw from participation in pharmacy services at any time.    Continue all meds under the continuation of care with the referring provider and clinical pharmacy team.

## 2025-05-05 DIAGNOSIS — J44.9 CHRONIC OBSTRUCTIVE PULMONARY DISEASE, UNSPECIFIED COPD TYPE (MULTI): ICD-10-CM

## 2025-05-05 DIAGNOSIS — E78.2 MIXED HYPERLIPIDEMIA: ICD-10-CM

## 2025-05-05 DIAGNOSIS — I50.32 CHRONIC HEART FAILURE WITH PRESERVED EJECTION FRACTION: ICD-10-CM

## 2025-05-05 DIAGNOSIS — I25.10 CORONARY ARTERY DISEASE INVOLVING NATIVE CORONARY ARTERY OF NATIVE HEART WITHOUT ANGINA PECTORIS: ICD-10-CM

## 2025-05-05 DIAGNOSIS — I50.30 HEART FAILURE WITH PRESERVED LEFT VENTRICULAR FUNCTION: ICD-10-CM

## 2025-05-05 DIAGNOSIS — I10 ESSENTIAL HYPERTENSION: ICD-10-CM

## 2025-05-05 RX ORDER — TORSEMIDE 20 MG/1
40 TABLET ORAL DAILY
Qty: 180 TABLET | Refills: 1 | Status: SHIPPED | OUTPATIENT
Start: 2025-05-05 | End: 2025-11-01

## 2025-05-05 RX ORDER — DILTIAZEM HYDROCHLORIDE 180 MG/1
180 CAPSULE, COATED, EXTENDED RELEASE ORAL DAILY
Qty: 90 CAPSULE | Refills: 1 | Status: SHIPPED | OUTPATIENT
Start: 2025-05-05 | End: 2025-11-01

## 2025-05-28 ENCOUNTER — APPOINTMENT (OUTPATIENT)
Dept: PHARMACY | Facility: HOSPITAL | Age: 55
End: 2025-05-28
Payer: COMMERCIAL

## 2025-06-03 NOTE — PROGRESS NOTES
Pharmacist Clinic: Cardiology Management    Corey Jj is a 55 y.o. male was referred to Clinical Pharmacy Team for heart failure management.     Referring Provider: Ivonne Gaona MD    THIS IS A FOLLOW UP PATIENT APPOINTMENT. AT LAST VISIT ON 04/01/2025 WITH PHARMACIST (Carlyn Simpson).    Appointment was completed by Annita (wife) who was reached at primary number.    REVIEW OF LAST APPT  Initial visit conducted with Annita (wife) to establish with clinical pharmacy. Patient medications and allergies were reviewed and updated. Patient is not currently taking Jardiance due to the cost of medication.  Wife states patient is currently taking spironolactone 25mg daily and torsemide 40mg daily. Wife reports adherence, no adverse effects and she reports patient is not experiencing any worsening s/s of heart failure. Patient does not take home BP or weigh himself.  Patient was screened for  PAP, unfortunately patient does not have prescription insurance therefore is ineligible for  PAP. Discussed [employer]  PAP application for Jardiance. [facility] mailed application to wife who understands to read directions thoroughly, fill out patient section and then turn application in to Dr. Gaona's office.    Allergies Reviewed? No    No Known Allergies    Past Medical History:   Diagnosis Date    COPD (chronic obstructive pulmonary disease) (Multi)     Diabetes (Multi)     MI (myocardial infarction) (Multi)        Current Outpatient Medications on File Prior to Visit   Medication Sig Dispense Refill    albuterol 90 mcg/actuation inhaler Inhale 2 puffs every 6 hours if needed for wheezing or shortness of breath. Use as needed, 1 to 2 puffs every 4 to 6 hours, no more than 4 times a day 18 g 11    aspirin 81 mg EC tablet Take 1 tablet (81 mg) by mouth once daily.      atorvastatin (Lipitor) 80 mg tablet Take 1 tablet (80 mg) by mouth once daily in the morning for 20 days. 20 tablet 0    buPROPion XL (Wellbutrin XL)  300 mg 24 hr tablet Take 1 tablet (300 mg) by mouth once daily.      dilTIAZem CD (Cardizem CD) 180 mg 24 hr capsule Take 1 capsule (180 mg) by mouth once daily. 90 capsule 1    empagliflozin (Jardiance) 10 mg Take 1 tablet (10 mg) by mouth once daily. (Patient not taking: Reported on 4/1/2025) 30 tablet 11    fluticasone-umeclidin-vilanter (Trelegy Ellipta) 100-62.5-25 mcg blister with device Inhale 1 puff once daily with breakfast. One inhalation a day, rinse mouth after use (Patient not taking: Reported on 4/1/2025) 60 each 11    gabapentin (Neurontin) 600 mg tablet Take 2 tablets (1,200 mg) by mouth once daily at bedtime.      mirtazapine (Remeron) 7.5 mg tablet Take 1 tablet (7.5 mg) by mouth once daily at bedtime.      nortriptyline (Pamelor) 75 mg capsule Take 2 capsules (150 mg) by mouth once daily at bedtime.      sertraline (Zoloft) 100 mg tablet Take 1 tablet (100 mg) by mouth 2 times a day.      spironolactone (Aldactone) 25 mg tablet Take 1 tablet (25 mg) by mouth once daily. 30 tablet 11    tadalafil (Cialis) 10 mg tablet Take 1 tablet (10 mg) by mouth once daily as needed for erectile dysfunction.      torsemide (Demadex) 20 mg tablet Take 2 tablets (40 mg) by mouth once daily. 180 tablet 1     No current facility-administered medications on file prior to visit.         RELEVANT LAB RESULTS:  Lab Results   Component Value Date    BILITOT 0.4 02/25/2025    CALCIUM 9.0 02/25/2025    CO2 28 02/25/2025     02/25/2025    CREATININE 1.09 02/25/2025    GLUCOSE 97 02/25/2025    ALKPHOS 106 02/25/2025    K 3.7 02/25/2025    PROT 6.8 02/25/2025     02/25/2025    AST 10 02/25/2025    ALT 12 02/25/2025    BUN 19 02/25/2025    ANIONGAP 10 02/25/2025    MG 2.15 11/01/2023    PHOS 4.1 11/01/2023     10/31/2023    ALBUMIN 4.1 02/25/2025    GFRMALE 70 03/31/2023     Lab Results   Component Value Date    TRIG 338 (H) 02/25/2025    CHOL 249 (H) 02/25/2025    LDLCALC 162 (H) 02/25/2025    HDL 35 (L)  "2025     No results found for: \"BMCBC\", \"CBCDIF\"     PHARMACEUTICAL ASSESSMENT:    MEDICATION RECONCILIATION    Drug Interactions? No    Medication Documentation Review Audit       Reviewed by Sadaf Stewart MA (Medical Assistant) on 25 at 1357      Medication Order Taking? Sig Documenting Provider Last Dose Status   albuterol 108 (90 Base) MCG/ACT inhaler [id number] Yes Inhale 2 puffs every 4 hours if needed. Historical Provider, MD  Active   albuterol 90 mcg/actuation inhaler [id number]  Inhale 2 puffs every 6 hours if needed for wheezing or shortness of breath. Use as needed, 1 to 2 puffs every 4 to 6 hours, no more than 4 times a day [facility]   02/15/24 235   aspirin 81 mg EC tablet [id number] Yes Take 1 tablet (81 mg) by mouth once daily. sometimes Historical Provider, MD  Active   atorvastatin (Lipitor) 80 mg tablet [id number]  Take 1 tablet (80 mg) by mouth once daily in the morning for 20 days.   Patient not taking: Reported on 2025    Ivonne Gaona MD  Active   buPROPion XL (Wellbutrin XL) 300 mg 24 hr tablet [id number] Yes Take 1 tablet (300 mg) by mouth once daily. Historical Provider, MD  Active   dilTIAZem CD (Cardizem CD) 180 mg 24 hr capsule [id number] Yes Take 1 capsule (180 mg) by mouth once daily. Ivonne Gaona MD  Active   fluticasone-umeclidin-vilanter (Trelegy Ellipta) 100-62.5-25 mcg blister with device [id number] Yes Inhale 1 puff once daily with breakfast. One inhalation a day, rinse mouth after use [facility]  Active   gabapentin (Neurontin) 600 mg tablet [id number]  Take 2 tablets (1,200 mg) by mouth once daily at bedtime. Historical Provider, MD   24 2359   mirtazapine (Remeron) 7.5 mg tablet [id number]  Take 1 tablet (7.5 mg) by mouth once daily at bedtime. Sho Wyatt MD   25 2359   nortriptyline (Pamelor) 75 mg capsule [id number] Yes Take 2 capsules (150 mg) by mouth once daily at bedtime. Historical Provider, " MD  Active   sertraline (Zoloft) 100 mg tablet [id number] Yes Take 1 tablet (100 mg) by mouth 2 times a day. Historical Provider, MD  Active   spironolactone (Aldactone) 25 mg tablet [id number] Yes Take 1 tablet (25 mg) by mouth once daily. Ivonne Soni MD  Active   tadalafil (Cialis) 10 mg tablet [id number] Yes Take 1 tablet (10 mg) by mouth once daily as needed for erectile dysfunction. Historical Provider, MD  Active   torsemide (Demadex) 20 mg tablet [id number] Yes Take 2 tablets (40 mg) by mouth once daily. Ivonne Soni MD  Active                    DISEASE MANAGEMENT ASSESSMENT:     CHF ASSESSMENT     Symptom/Staging:  -Most recent ejection fraction: 56%  -NYHA Stage: Class II    Results for orders placed during the hospital encounter of 02/25/25    Transthoracic echo (TTE) complete    [facility], [address] [place] [address]  Tel [phone] and Fax [phone]    TRANSTHORACIC ECHOCARDIOGRAM REPORT      Patient Name:       AMBROCIO Castillo Physician:    Lu Soni MD  Study Date:         2/25/2025           Ordering Provider:    Lu SONI  MRN/PID:            [id number]            Fellow:  Accession#:         [id number]        Nurse:                Caroline Butts RN  Date of Birth/Age:  1970 / 54 years Sonographer:          Rona Adkins RDCS  Gender assigned at  M                   Additional Staff:  Birth:  Height:             187.96 cm           Admit Date:  Weight:             153.32 kg           Admission Status:     Outpatient  BSA / BMI:          2.72 m2 / 43.40     Encounter#:           [id number]  kg/m2  Blood Pressure:     153/70 mmHg         Department Location:  [facility] Non  Invasive    Study Type:    TRANSTHORACIC ECHO (TTE) COMPLETE  Diagnosis/ICD: Unspecified diastolic (congestive) heart failure (CHF)-I50.30;  Presence of coronary angioplasty implant and graft (stent)-Z95.5;  Atherosclerotic heart disease of  native coronary artery without  angina pectoris-I25.10  Indication:    Congestive Heart Failure, CAD, Cardiac stent  CPT Code:      Echo Complete w Full Doppler-[id number]    Patient History:  Pertinent History: CHF, CAD, Dyspnea and HTN. Cardiac stent.    Study Detail: The following Echo studies were performed: 2D, M-Mode, Doppler and  color flow. Technically challenging study due to body habitus.  Definity used as a contrast agent for endocardial border  definition. Total contrast used for this procedure was 2.5 mL via  IV push. The patient was awake.      PHYSICIAN INTERPRETATION:  Left Ventricle: The left ventricular systolic function is normal, with a Barry's biplane calculated ejection fraction of 56%. There is left ventricular hypertrophy involving the basal wall and mid wall, with an asymmetric distribution. There are no regional left ventricular wall motion abnormalities. The left ventricular cavity size is mildly dilated. There is mildly increased posterior left ventricular wall thickness. Findings are consistent with hypertrophic cardiomyopathy. Spectral Doppler shows a Grade II (pseudonormal pattern) of left ventricular diastolic filling with an elevated left atrial pressure. No apparent obstructive physiology, no provocation was done.  Left Atrium: The left atrial size is severely dilated. There is evidence of an atrial septal aneurysm.  Right Ventricle: The right ventricle is normal in size. There is normal right ventricular global systolic function.  Right Atrium: The right atrium is mildly dilated.  Aortic Valve: The aortic valve is trileaflet. There is mild aortic valve cusp calcification. There is evidence of mildly elevated transaortic gradients consistent with sclerosis of the aortic valve. The aortic valve dimensionless index is 0.77. There is mild to moderate aortic valve regurgitation. The peak instantaneous gradient of the aortic valve is 17 mmHg. The mean gradient of the aortic valve is 10  mmHg.  Mitral Valve: The mitral valve is mildly thickened. The peak instantaneous gradient of the mitral valve is 13 mmHg. There is mild to moderate mitral valve regurgitation. Valve is not well visualized.  No VITO noted on M-Mode.  Tricuspid Valve: The tricuspid valve is structurally normal. There is mild tricuspid regurgitation. The right ventricular systolic pressure is unable to be estimated.  Pulmonic Valve: The pulmonic valve is structurally normal. There is trace pulmonic valve regurgitation.  Pericardium: There is no pericardial effusion noted.  Aorta: The aortic root is normal. The aortic root is at the upper limits of normal size.      CONCLUSIONS:  1. The left ventricular systolic function is normal, with a Barry's biplane calculated ejection fraction of 56%.  2. Spectral Doppler shows a Grade II (pseudonormal pattern) of left ventricular diastolic filling with an elevated left atrial pressure.  3. Left ventricular cavity size is mildly dilated.  4. There is hypertrophic cardiomyopathy.  5. There is normal right ventricular global systolic function.  6. The left atrial size is severely dilated.  7. Mild to moderate mitral valve regurgitation.  8. Aortic valve sclerosis.  9. Mild to moderate aortic valve regurgitation.    QUANTITATIVE DATA SUMMARY:    2D MEASUREMENTS:          Normal Ranges:  Ao Root d:       3.20 cm  (2.0-3.7cm)  IVSd:            1.80 cm  (0.6-1.1cm)  LVPWd:           1.30 cm  (0.6-1.1cm)  LVIDd:           5.96 cm  (3.9-5.9cm)  LVIDs:           4.19 cm  LV Mass Index:   163 g/m2  LVEDV Index:     90 ml/m2  LV % FS          29.7 %      LEFT ATRIUM:                  Normal Ranges:  LA Vol A4C:        116.3 ml   (22+/-6mL/m2)  LA Vol A2C:        130.7 ml  LA Vol BP:         124.7 ml  LA Vol Index A4C:  42.8ml/m2  LA Vol Index A2C:  48.1 ml/m2  LA Vol Index BP:   45.9 ml/m2  LA Area A4C:       32.9 cm2  LA Area A2C:       34.5 cm2  LA Major Axis A4C: 7.9 cm  LA Major Axis A2C: 7.7 cm  LA  Volume Index:   44.1 ml/m2  LA Vol A4C:        111.0 ml  LA Vol A2C:        127.0 ml  LA Vol Index BSA:  43.8 ml/m2      RIGHT ATRIUM:          Normal Ranges:  RA Area A4C:  23.4 cm2      M-MODE MEASUREMENTS:         Normal Ranges:  Ao Root:             3.30 cm (2.0-3.7cm)  LAs:                 4.30 cm (2.7-4.0cm)      AORTA MEASUREMENTS:         Normal Ranges:  Ao Sinus, d:        3.20 cm (2.1-3.5cm)  Asc Ao, d:          3.70 cm (2.1-3.4cm)      LV SYSTOLIC FUNCTION:  Normal Ranges:  EF-A4C View:    56 % (>=55%)  EF-A2C View:    53 %  EF-Biplane:     56 %  LV EF Reported: 56 %      LV DIASTOLIC FUNCTION:             Normal Ranges:  MV Peak E:             0.94 m/s    (0.7-1.2 m/s)  MV Peak A:             0.85 m/s    (0.42-0.7 m/s)  E/A Ratio:             1.10        (1.0-2.2)  MV e'                  0.072 m/s   (>8.0)  MV lateral e'          0.08 m/s  MV medial e'           0.06 m/s  MV A Dur:              129.00 msec  E/e' Ratio:            13.04       (<8.0)  PulmV Sys Patrick:         41.50 cm/s  PulmV Cash Patrick:        41.80 cm/s  PulmV S/D Patrick:         0.90  PulmV A Revs Patrick:      26.40 cm/s  PulmV A Revs Dur:      119.00 msec      MITRAL VALVE:           Normal Ranges:  MV Vmax:      1.82 m/s  (<=1.3m/s)  MV peak P.2 mmHg (<5mmHg)  MV mean P.5 mmHg  (<2mmHg)  MV DT:        315 msec  (150-240msec)      MITRAL INSUFFICIENCY:             Normal Ranges:  MR VTI:               167.50 cm  MR Vmax:              557.50 cm/s      AORTIC VALVE:                      Normal Ranges:  AoV Vmax:                2.09 m/s  (<=1.7m/s)  AoV Peak P.5 mmHg (<20mmHg)  AoV Mean PG:             10.0 mmHg (1.7-11.5mmHg)  LVOT Max Patrick:            1.50 m/s  (<=1.1m/s)  AoV VTI:                 41.80 cm  (18-25cm)  LVOT VTI:                32.00 cm  LVOT Diameter:           2.00 cm   (1.8-2.4cm)  AoV Area, VTI:           2.41 cm2  (2.5-5.5cm2)  AoV Area,Vmax:           2.25 cm2  (2.5-4.5cm2)  AoV Dimensionless  Index: 0.77      AORTIC INSUFFICIENCY:  AI Vmax:       4.15 m/s  AI Half-time:  353 msec  AI Decel Rate: 345.00 cm/s2      RIGHT VENTRICLE:  RV Basal 3.51 cm  RV Mid   1.86 cm  RV Major 8.7 cm  TAPSE:   23.1 mm  RV s'    0.13 m/s      TRICUSPID VALVE/RVSP:          Normal Ranges:  Peak TR Velocity:     2.69 m/s  RV Syst Pressure:     32 mmHg  (< 30mmHg)  IVC Diam:             1.40 cm      PULMONIC VALVE:          Normal Ranges:  PV Max Patrick:     1.2 m/s  (0.6-0.9m/s)  PV Max P.6 mmHg      PULMONARY VEINS:  PulmV A Revs Dur: 119.00 msec  PulmV A Revs Patrick: 26.40 cm/s  PulmV Cash Patrick:   41.80 cm/s  PulmV S/D Patrick:    0.90  PulmV Sys Patrick:    41.50 cm/s      [id number] Ivonne Gaona MD  Electronically signed on 2025 at 9:51:29 AM        ** Final (Updated) **      Guideline-Directed Medical Therapy:  -ARNI: No   -If no, then ACEi/ARB?: No  -Beta Blocker: No-- did not tolerate metoprolol due to erectile dysfunction  -MRA: Yes, describe: Spironolactone 25mg daily  -SGLT2i: Yes, describe: Jardiance 10mg daily- prescribed but not taking due to cost    Secondary Prevention:  -The ASCVD Risk score (Radha SPARKS, et al., 2019) failed to calculate for the following reasons:    Risk score cannot be calculated because patient has a medical history suggesting prior/existing ASCVD   -Aspirin 81mg? yes  -Statin?: Yes, describe: Atorvastatin 80mg daily  -HTN?: Yes, describe: 131/75 at last OV    CURRENT PHARMACOTHERAPY:   Spironolactone 25mg daily  Torsemide 40mg daily, may take an additional if needed for weight gain    Affordability:  PAP denial,  PAP  Adherence/Compliance: reports adherence  Adverse Effects: none reported    Monitoring Weights at Home: No  Home Weight Recordings: unable to assess    Past In Office Weight Readings:   Wt Readings from Last 6 Encounters:   25 (!) 151 kg (333 lb 5.4 oz)   24 (!) 154 kg (338 lb 6.5 oz)   11/15/24 (!) 158 kg (349 lb 6.4 oz)   24 150 kg (329 lb 14.4 oz)    03/06/24 145 kg (320 lb)   01/16/24 (!) 150 kg (331 lb 9.6 oz)       Monitoring Blood Pressure at Home: No  Home BP Recordings: unable to assess    Past In Office BP Readings:   BP Readings from Last 6 Encounters:   02/25/25 131/75   12/17/24 154/79   11/15/24 140/65   04/09/24 140/75   03/06/24 137/62   01/16/24 117/67       HEALTH MANAGEMENT    Maintaining fluid restriction (<2 L/day): wife is unsure  Edema/swelling: unable to assess  Shortness of breath: Yes- on exertion, doing farm work  Trouble sleeping/lying down: No; uses CPAP nightly   Dry/hacking cough: No  Recent Hospitalizations: No    EDUCATION/COUNSELING:   - Counseled patient on MOA, expectations, duration of therapy, contraindications, administration, and monitoring parameters  - Counseled patient on lifestyle modifications that can decrease your risk of having complications (smoking cessation, losing weight, daily weights, vaccines)  - Counseled patient on fluid intake and weight management. Recommended to not consume more than 2 liters of fliuids per day. If they have gained more than 2-3 pounds within a 24 hours period (or 5 pounds in a week), contact their cardiologist  - Answered all patient questions and concerns       DISCUSSION/NOTES:   Wife reports adherence to medications patient can afford, no adverse effects and she reports patient is not experiencing any worsening s/s of heart failure. Patient does not take home BP or weigh himself.  Patient completed  PAP and was sent in mail, which was received by [facility]. [facility] sent application to cardiologist office to submit. Patient has not heard from cardiologist office in regard to status of  application, I recommend patient call provider office to inquire further about status of [employer] (Jardiance) PAP.  At this time clinical pharmacy will sign off because patient is utilizing  PAP. Patient understands to contact cardiologist office for further updates on  medication.    ASSESSMENT:    Assessment/Plan   Problem List Items Addressed This Visit       Heart failure with preserved left ventricular function    Patient currently taking 1 of 4 GDMT, unfortunately cannot afford Jardiance. Unable to assess home BP. Renal function and potassium level appropriate to continue current regimen as prescribed.     Labs (02/25/2025): Scr-1.09 eGFR-81 K-3.7            RECOMMENDATIONS/PLAN:    CONTINUE  Spironolactone 25mg daily  Jardiance 10mg daily as prescribed  Torsemide 40mg daily    Last Appnt with Referring Provider: 02/25/2025  Next Appnt with Referring Provider: 08/26/2025  Clinical Pharmacist follow up: not needed  VAF/Application Expiration: No- denial  Type of Encounter: Erick Simpson PharmD    Verbal consent to manage patient's drug therapy was obtained from the patient . They were informed they may decline to participate or withdraw from participation in pharmacy services at any time.    Continue all meds under the continuation of care with the referring provider and clinical pharmacy team.

## 2025-06-09 ENCOUNTER — APPOINTMENT (OUTPATIENT)
Dept: PHARMACY | Facility: HOSPITAL | Age: 55
End: 2025-06-09
Payer: COMMERCIAL

## 2025-06-09 DIAGNOSIS — I50.30 HEART FAILURE WITH PRESERVED LEFT VENTRICULAR FUNCTION: ICD-10-CM

## 2025-06-09 NOTE — Clinical Note
Clint Gaona,  Today I spoke with Annita (wife) about Corey's heart medications. Wife reports adherence to medications they can afford, no adverse effects and she reports patient is not experiencing any worsening s/s of heart failure. Patient does not take home BP or weigh himself. Patient completed Jardiance  cost assistance application and sent to your office via mail. I encouraged patient wife to call for an update in regard to Jardiance application status. Thank you!

## 2025-06-20 ENCOUNTER — HOSPITAL ENCOUNTER (EMERGENCY)
Facility: HOSPITAL | Age: 55
Discharge: HOME | End: 2025-06-20
Attending: EMERGENCY MEDICINE
Payer: COMMERCIAL

## 2025-06-20 ENCOUNTER — APPOINTMENT (OUTPATIENT)
Dept: RADIOLOGY | Facility: HOSPITAL | Age: 55
End: 2025-06-20
Payer: COMMERCIAL

## 2025-06-20 VITALS
SYSTOLIC BLOOD PRESSURE: 135 MMHG | OXYGEN SATURATION: 99 % | HEIGHT: 74 IN | TEMPERATURE: 97.7 F | HEART RATE: 72 BPM | WEIGHT: 315 LBS | BODY MASS INDEX: 40.43 KG/M2 | DIASTOLIC BLOOD PRESSURE: 68 MMHG | RESPIRATION RATE: 18 BRPM

## 2025-06-20 DIAGNOSIS — M25.562 ACUTE PAIN OF LEFT KNEE: Primary | ICD-10-CM

## 2025-06-20 PROCEDURE — 73564 X-RAY EXAM KNEE 4 OR MORE: CPT | Mod: LEFT SIDE | Performed by: RADIOLOGY

## 2025-06-20 PROCEDURE — 2500000001 HC RX 250 WO HCPCS SELF ADMINISTERED DRUGS (ALT 637 FOR MEDICARE OP): Performed by: EMERGENCY MEDICINE

## 2025-06-20 PROCEDURE — 99283 EMERGENCY DEPT VISIT LOW MDM: CPT | Performed by: EMERGENCY MEDICINE

## 2025-06-20 PROCEDURE — 73564 X-RAY EXAM KNEE 4 OR MORE: CPT | Mod: LT

## 2025-06-20 RX ORDER — HYDROCODONE BITARTRATE AND ACETAMINOPHEN 5; 325 MG/1; MG/1
1 TABLET ORAL ONCE
Refills: 0 | Status: COMPLETED | OUTPATIENT
Start: 2025-06-20 | End: 2025-06-20

## 2025-06-20 RX ORDER — HYDROCODONE BITARTRATE AND ACETAMINOPHEN 5; 325 MG/1; MG/1
1 TABLET ORAL EVERY 6 HOURS PRN
Qty: 12 TABLET | Refills: 0 | Status: SHIPPED | OUTPATIENT
Start: 2025-06-20 | End: 2025-06-23

## 2025-06-20 RX ORDER — IBUPROFEN 600 MG/1
600 TABLET, FILM COATED ORAL EVERY 8 HOURS PRN
Qty: 30 TABLET | Refills: 0 | Status: SHIPPED | OUTPATIENT
Start: 2025-06-20

## 2025-06-20 RX ADMIN — HYDROCODONE BITARTRATE AND ACETAMINOPHEN 1 TABLET: 5; 325 TABLET ORAL at 12:08

## 2025-06-20 ASSESSMENT — PAIN - FUNCTIONAL ASSESSMENT: PAIN_FUNCTIONAL_ASSESSMENT: 0-10

## 2025-06-20 ASSESSMENT — PAIN SCALES - GENERAL: PAINLEVEL_OUTOF10: 4

## 2025-06-20 NOTE — ED PROVIDER NOTES
Emergency Department         [facility]   ED  Provider Note  6/20/2025 10:44 AM  AC10/AC10      Chief Complaint   Patient presents with    Knee Pain     1 month        History of Present Illness:   Croey Jj is a 55 y.o. male presenting to the ED for left knee pain, beginning last night.  The complaint has been persistent, moderate in severity, and worsened by weightbearing.  Patient had a remote tibial plateau fracture with screws used to repair of the tibial plateau.  Last night he was walking into his home when his knee just gave out on him causing him to fall.  He has mild left lateral knee pain.  He denies significant swelling.  There is no pain in his hip or ankle.  He did not trip.  He was not dizzy or lightheaded.  He had sudden onset of pain and he could not bear weight and he went to the ground.  He denies injury from the fall.      Review of Systems:   Pertinent positives and review of systems as noted above.  Remaining 10 review of systems is negative or noncontributory to today's episode of care.  Review of Systems       --------------------------------------------- PAST HISTORY ---------------------------------------------  Past Medical History:   Past Medical History:   Diagnosis Date    CHF (congestive heart failure)     COPD (chronic obstructive pulmonary disease) (Multi)     Diabetes (Multi)     MI (myocardial infarction) (Multi)         Past Surgical History: History reviewed. No pertinent surgical history.     Social History:   Social History     Social History Narrative    Not on file        Family History: family history is not on file. Unless otherwise noted, family history is non contributory    Patient's Medications   New Prescriptions    No medications on file   Previous Medications    ALBUTEROL 90 MCG/ACTUATION INHALER    Inhale 2 puffs every 6 hours if needed for wheezing or shortness of breath. Use as needed, 1 to 2 puffs every 4 to 6 hours, no more than 4 times a day    ASPIRIN  81 MG EC TABLET    Take 1 tablet (81 mg) by mouth once daily.    ATORVASTATIN (LIPITOR) 80 MG TABLET    Take 1 tablet (80 mg) by mouth once daily in the morning for 20 days.    BUPROPION XL (WELLBUTRIN XL) 300 MG 24 HR TABLET    Take 1 tablet (300 mg) by mouth once daily.    DILTIAZEM CD (CARDIZEM CD) 180 MG 24 HR CAPSULE    Take 1 capsule (180 mg) by mouth once daily.    EMPAGLIFLOZIN (JARDIANCE) 10 MG    Take 1 tablet (10 mg) by mouth once daily.    FLUTICASONE-UMECLIDIN-VILANTER (TRELEGY ELLIPTA) 100-62.5-25 MCG BLISTER WITH DEVICE    Inhale 1 puff once daily with breakfast. One inhalation a day, rinse mouth after use    GABAPENTIN (NEURONTIN) 600 MG TABLET    Take 2 tablets (1,200 mg) by mouth once daily at bedtime.    MIRTAZAPINE (REMERON) 7.5 MG TABLET    Take 1 tablet (7.5 mg) by mouth once daily at bedtime.    NORTRIPTYLINE (PAMELOR) 75 MG CAPSULE    Take 2 capsules (150 mg) by mouth once daily at bedtime.    SERTRALINE (ZOLOFT) 100 MG TABLET    Take 1 tablet (100 mg) by mouth 2 times a day.    SPIRONOLACTONE (ALDACTONE) 25 MG TABLET    Take 1 tablet (25 mg) by mouth once daily.    TADALAFIL (CIALIS) 10 MG TABLET    Take 1 tablet (10 mg) by mouth once daily as needed for erectile dysfunction.    TORSEMIDE (DEMADEX) 20 MG TABLET    Take 2 tablets (40 mg) by mouth once daily.   Modified Medications    No medications on file   Discontinued Medications    No medications on file      The patient’s home medications have been reviewed.    Allergies: Patient has no known allergies.    -------------------------------------------------- RESULTS -------------------------------------------------  All laboratory and radiology results have been personally reviewed by myself   LABS:  Labs Reviewed - No data to display      RADIOLOGY:  Interpreted by Radiologist.  XR knee left 4+ views   Final Result   No fracture. Small joint effusion. Mild to moderate degenerative   disease.   Signed by Hubert Long MD     "      Encounter Date: 12/17/24   ECG 12 lead (Clinic Performed)   Result Value    Ventricular Rate 73    Atrial Rate 73    ME Interval 160    QRS Duration 90    QT Interval 434    QTC Calculation(Bazett) 478    P Axis 46    R Axis 27    T Axis 48    QRS Count 12    Q Onset 217    P Onset 137    P Offset 205    T Offset 434    QTC Fredericia 463    Narrative    Normal sinus rhythm  Septal infarct , age undetermined  Abnormal ECG  When compared with ECG of 06-MAR-2024 14:53,  T wave amplitude has increased in Lateral leads  Confirmed by Ivonne Gaona ([id number]) on 12/17/2024 3:24:24 PM     ------------------------- NURSING NOTES AND VITALS REVIEWED ---------------------------   The nursing notes within the ED encounter and vital signs as below have been reviewed.   /68   Pulse 72   Temp 36.5 °C (97.7 °F) (Temporal)   Resp 18   Ht 1.88 m (6' 2\")   Wt 150 kg (330 lb)   SpO2 99%   BMI 42.37 kg/m²   Oxygen Saturation Interpretation: Normal      ---------------------------------------------------PHYSICAL EXAM--------------------------------------  Physical Exam   Constitutional/General: Alert,  well appearing, non toxic in NAD  Head: Normocephalic and atraumatic  Eyes: PERRL, EOMI, conjunctiva normal, sclera non icteric  Mouth: Oropharynx clear, handling secretions, no trismus, no asymmetry of the posterior oropharynx or uvular edema  Neck: Supple, full ROM, non tender to palpation in the midline, no stridor, no crepitus, no meningeal signs  Respiratory: Lungs clear to auscultation bilaterally, no wheezes, rales, or rhonchi. Not in respiratory distress  Cardiovascular:  Regular rate. Regular rhythm. No murmurs, gallops, or rubs. 2+ distal pulses  Chest: No chest wall tenderness  GI:  Abdomen Soft, Non tender, Non distended.  +BS. No organomegaly, no palpable masses,  No rebound, guarding, or rigidity.   Musculoskeletal: Moves all extremities x 4. Warm and well perfused, no clubbing, cyanosis, or edema. Capillary " refill <3 seconds there is moderate tenderness to the lateral aspect of the left knee.  The cruciate ligaments are grossly intact without laxity.  The medial and  lateral collateral ligaments are grossly stable.  He has no pain with axial loading of the knee joint.  There is no significant effusion.  The patella is nontender.  Range of motion is limited by pain.  Integument: skin warm and dry. No rashes.   Lymphatic: no lymphadenopathy noted  Neurologic: No focal deficits, symmetric strength 5/5 in the upper and lower extremities bilaterally  Psychiatric: Normal Affect    Procedures    ------------------------------ ED COURSE/MEDICAL DECISION MAKING----------------------  Diagnoses as of 06/20/25 1206   Acute pain of left knee      Patient's exam reveals mild tenderness over the lateral joint line.  There is no significant laxity of the lateral collateral ligament.  Remaining ligaments are also intact.  The patella is nontender in the popliteal space is nontender.  There is minimal joint effusion.  There is no erythema or warmth.  The patient has no history of gout.  Patient's x-ray shows postoperative changes of the screws transfixing the tibial plateau.  No obvious fracture dislocation or loose body.  The patient will require a knee immobilizer pain medications and orthopedic follow-up.  I suspect he has a loose body as the cause of the sudden pain.  I will have him follow-up with orthopedic surgeon to make sure that the lateral meniscus is intact.  Differential Diagnosis: Loose body right knee, lateral meniscus tear, arthritic changes left knee, knee pain, occult fracture    Medical Decision Making:   Patient placed in knee immobilizer and given crutches.  He was provided hydrocodone acetaminophen for pain for the first 3 days followed by ibuprofen.  I have asked him to follow-up with Dr. Sanchez for further care and treatment.  Diagnoses as of 06/20/25 1206   Acute pain of left knee        Counseling:   The  emergency provider has spoken with the patient and discussed today’s results, in addition to providing specific details for the plan of care and counseling regarding the diagnosis and prognosis.  Questions are answered at this time and they are agreeable with the plan.      --------------------------------- IMPRESSION AND DISPOSITION ---------------------------------        IMPRESSION  1. Acute pain of left knee        DISPOSITION  Disposition: Discharge to home  Patient condition is fair      Billing Provider Critical Care Time: 0 minutes     Gelacio Paniagua MD  06/20/25 [id number]

## 2025-06-20 NOTE — ED TRIAGE NOTES
Pt arrived with left knee pain for about 1 month. Had a previous injury. Pt states today it is worse

## 2025-06-20 NOTE — DISCHARGE INSTRUCTIONS
Nebulizer and crutches for comfort.    Hydrocodone or ibuprofen for pain.    Ice and elevation will help control the pain as well.    Please call Dr. Sanchez's office today to set up an appointment for further evaluation.    Return to the ER for any worsening symptoms or concerns.

## 2025-07-29 DIAGNOSIS — I50.30 HEART FAILURE WITH PRESERVED LEFT VENTRICULAR FUNCTION: ICD-10-CM

## 2025-07-29 RX ORDER — TORSEMIDE 20 MG/1
40 TABLET ORAL DAILY
Qty: 180 TABLET | Refills: 1 | Status: SHIPPED | OUTPATIENT
Start: 2025-07-29 | End: 2026-01-25